# Patient Record
Sex: FEMALE | Employment: OTHER | URBAN - METROPOLITAN AREA
[De-identification: names, ages, dates, MRNs, and addresses within clinical notes are randomized per-mention and may not be internally consistent; named-entity substitution may affect disease eponyms.]

---

## 2018-08-03 ENCOUNTER — HOSPITAL ENCOUNTER (OUTPATIENT)
Dept: ULTRASOUND IMAGING | Facility: CLINIC | Age: 28
Discharge: HOME | End: 2018-08-03
Attending: FAMILY MEDICINE | Admitting: FAMILY MEDICINE
Payer: MEDICAID

## 2018-08-03 DIAGNOSIS — N91.2 AMENORRHEA: ICD-10-CM

## 2018-08-03 PROCEDURE — 76805 OB US >/= 14 WKS SNGL FETUS: CPT

## 2018-08-03 PROCEDURE — T1013 SIGN LANG/ORAL INTERPRETER: HCPCS | Mod: U3

## 2018-08-13 DIAGNOSIS — Z36.89 ENCOUNTER FOR FETAL ANATOMIC SURVEY: Primary | ICD-10-CM

## 2018-08-13 LAB
ABO + RH BLD: NORMAL
ABO + RH BLD: NORMAL
BLD GP AB SCN SERPL QL: NORMAL
HBV SURFACE AG SERPL QL IA: NONREACTIVE
HIV 1+2 AB+HIV1 P24 AG SERPL QL IA: NONREACTIVE
TREPONEMA ANTIBODIES: NONREACTIVE

## 2018-08-22 LAB
C TRACH DNA SPEC QL PROBE+SIG AMP: NEGATIVE
N GONORRHOEA DNA SPEC QL PROBE+SIG AMP: NEGATIVE

## 2018-09-04 ENCOUNTER — PRE VISIT (OUTPATIENT)
Dept: MATERNAL FETAL MEDICINE | Facility: CLINIC | Age: 28
End: 2018-09-04

## 2018-09-06 ENCOUNTER — OFFICE VISIT (OUTPATIENT)
Dept: MATERNAL FETAL MEDICINE | Facility: CLINIC | Age: 28
End: 2018-09-06
Attending: ADVANCED PRACTICE MIDWIFE
Payer: MEDICAID

## 2018-09-06 ENCOUNTER — HOSPITAL ENCOUNTER (OUTPATIENT)
Dept: ULTRASOUND IMAGING | Facility: CLINIC | Age: 28
Discharge: HOME | End: 2018-09-06
Attending: ADVANCED PRACTICE MIDWIFE | Admitting: ADVANCED PRACTICE MIDWIFE
Payer: MEDICAID

## 2018-09-06 DIAGNOSIS — O26.90 PREGNANCY RELATED CONDITION, ANTEPARTUM: ICD-10-CM

## 2018-09-06 DIAGNOSIS — Z20.821 ZIKA VIRUS EXPOSURE AFFECTING PREGNANCY: Primary | ICD-10-CM

## 2018-09-06 DIAGNOSIS — O99.891 ZIKA VIRUS EXPOSURE AFFECTING PREGNANCY: Primary | ICD-10-CM

## 2018-09-06 PROCEDURE — 76811 OB US DETAILED SNGL FETUS: CPT

## 2018-09-06 NOTE — MR AVS SNAPSHOT
After Visit Summary   9/6/2018    Christa Martinez    MRN: [id number]           Patient Information     Date Of Birth          1990        Visit Information        Provider Department      9/6/2018 10:45 AM Modesto Regalado MD [facility] Maternal Fetal Medicine [facility]        Today's Diagnoses     Zika virus exposure affecting pregnancy    -  1       Follow-ups after your visit        Your next 10 appointments already scheduled     Oct 22, 2018 10:15 AM CDT   MFM US COMPRE SINGLE F/U with [id number]   [facility] Maternal Fetal Medicine Ultrasound - [facility])    [address]  [facility] [address] [phone]           Wear comfortable clothes and leave your valuables at home.            Oct 22, 2018 10:45 AM CDT   Radiology MD with UR KAMAR CEE   [facility] Maternal Fetal Medicine - [facility])    [address]  [facility] [address]   [phone]           Please arrive at the time given for your first appointment. This visit is used internally to schedule the physician's time during your ultrasound.              Future tests that were ordered for you today     Open Future Orders        Priority Expected Expires Ordered    MFM US Comprehensive Single F/U Routine 10/18/2018 9/6/2019 9/6/2018            Who to contact     If you have questions or need follow up information about today's clinic visit or your schedule please contact [facility] MATERNAL FETAL MEDICINE [facility] directly at [phone].  Normal or non-critical lab and imaging results will be communicated to you by MyChart, letter or phone within 4 business days after the clinic has received the results. If you do not hear from us within 7 days, please contact the clinic through MyChart or phone. If you have a critical or abnormal lab result, we will notify you by phone as soon as possible.  Submit refill requests  through [employer] or call your pharmacy and they will forward the refill request to us. Please allow 3 business days for your refill to be completed.          Additional Information About Your Visit        Care EveryWhere ID     This is your Care EveryWhere ID. This could be used by other organizations to access your [place] medical records  [id number]         Blood Pressure from Last 3 Encounters:   No data found for BP    Weight from Last 3 Encounters:   No data found for Wt               Primary Care Provider Office Phone # Fax #    [facility] [phone]       [address] [place] [address]        Equal Access to Services     [facility] : Hadii aad ku hadasho Soomaali, waaxda luqadaha, qaybta kaalmada adezahraayaanjum, aden riley . So [facility] [phone].    ATENCIÓN: Si habla español, tiene a vaughan disposición servicios gratuitos de asistencia lingüística. [facility] [phone].    We comply with applicable federal civil rights laws and [place] laws. We do not discriminate on the basis of race, color, national origin, age, disability, sex, sexual orientation, or gender identity.            Thank you!     Thank you for choosing [facility]  for your care. Our goal is always to provide you with excellent care. Hearing back from our patients is one way we can continue to improve our services. Please take a few minutes to complete the written survey that you may receive in the mail after your visit with us. Thank you!             Your Updated Medication List - Protect others around you: Learn how to safely use, store and throw away your medicines at www.disposemymeds.org.      Notice  As of 9/6/2018  5:25 PM    You have not been prescribed any medications.

## 2018-09-06 NOTE — PROGRESS NOTES
"Please see \"Imaging\" tab under \"Chart Review\" for details of today's visit.    Modesto Regalado    "

## 2018-10-22 ENCOUNTER — HOSPITAL ENCOUNTER (OUTPATIENT)
Dept: ULTRASOUND IMAGING | Facility: CLINIC | Age: 28
Discharge: HOME | End: 2018-10-22
Attending: OBSTETRICS & GYNECOLOGY | Admitting: OBSTETRICS & GYNECOLOGY
Payer: MEDICAID

## 2018-10-22 ENCOUNTER — OFFICE VISIT (OUTPATIENT)
Dept: MATERNAL FETAL MEDICINE | Facility: CLINIC | Age: 28
End: 2018-10-22
Attending: OBSTETRICS & GYNECOLOGY
Payer: MEDICAID

## 2018-10-22 DIAGNOSIS — Z20.821 ZIKA VIRUS EXPOSURE AFFECTING PREGNANCY: Primary | ICD-10-CM

## 2018-10-22 DIAGNOSIS — O99.891 ZIKA VIRUS EXPOSURE AFFECTING PREGNANCY: Primary | ICD-10-CM

## 2018-10-22 DIAGNOSIS — O99.891 ZIKA VIRUS EXPOSURE AFFECTING PREGNANCY: ICD-10-CM

## 2018-10-22 DIAGNOSIS — Z20.821 ZIKA VIRUS EXPOSURE AFFECTING PREGNANCY: ICD-10-CM

## 2018-10-22 PROBLEM — Z34.80 SUPERVISION OF OTHER NORMAL PREGNANCY, ANTEPARTUM: Status: ACTIVE | Noted: 2018-10-22

## 2018-10-22 PROCEDURE — 76816 OB US FOLLOW-UP PER FETUS: CPT

## 2018-10-22 NOTE — PROGRESS NOTES
"Please see \"Imaging\" tab under \"Chart Review\" for details of today's US at the [facility].    Miguel Bear MD  Maternal-Fetal Medicine      "

## 2018-10-22 NOTE — MR AVS SNAPSHOT
After Visit Summary   10/22/2018    Christa Martinez    MRN: [id number]           Patient Information     Date Of Birth          1990        Visit Information        Provider Department      10/22/2018 10:45 AM Miguel Bear MD [facility] Maternal Fetal Medicine [facility]        Today's Diagnoses     Zika virus exposure affecting pregnancy    -  1       Follow-ups after your visit        Your next 10 appointments already scheduled     Nov 19, 2018 11:45 AM CST   MFM US COMPRE SINGLE F/U with [id number]   [facility] Maternal Fetal Medicine Ultrasound - [facility])    [address]  [facility] [address] [phone]           Wear comfortable clothes and leave your valuables at home.            Nov 19, 2018 12:15 PM CST   Radiology MD with UR KAMAR CEE   [facility] Maternal Fetal Medicine - [facility])    [address]  [facility] [address]   [phone]           Please arrive at the time given for your first appointment. This visit is used internally to schedule the physician's time during your ultrasound.              Future tests that were ordered for you today     Open Future Orders        Priority Expected Expires Ordered    MFM US Comprehensive Single F/U Routine  10/22/2019 10/22/2018            Who to contact     If you have questions or need follow up information about today's clinic visit or your schedule please contact [facility] MATERNAL FETAL MEDICINE [facility] directly at [phone].  Normal or non-critical lab and imaging results will be communicated to you by MyChart, letter or phone within 4 business days after the clinic has received the results. If you do not hear from us within 7 days, please contact the clinic through MyChart or phone. If you have a critical or abnormal lab result, we will notify you by phone as soon as possible.  Submit refill requests  through [employer] or call your pharmacy and they will forward the refill request to us. Please allow 3 business days for your refill to be completed.          Additional Information About Your Visit        Care EveryWhere ID     This is your Care EveryWhere ID. This could be used by other organizations to access your [place] medical records  [id number]         Blood Pressure from Last 3 Encounters:   No data found for BP    Weight from Last 3 Encounters:   No data found for Wt               Primary Care Provider Office Phone # Fax #    [facility] [phone]       [address] [place] [address]        Equal Access to Services     [facility] : Hadii aad ku hadasho Soomaali, waaxda luqadaha, qaybta kaalmada adezahraayaanjum, aden riley . So [facility] [phone].    ATENCIÓN: Si habla español, tiene a vaughan disposición servicios gratuitos de asistencia lingüística. [facility] [phone].    We comply with applicable federal civil rights laws and [place] laws. We do not discriminate on the basis of race, color, national origin, age, disability, sex, sexual orientation, or gender identity.            Thank you!     Thank you for choosing [facility]  for your care. Our goal is always to provide you with excellent care. Hearing back from our patients is one way we can continue to improve our services. Please take a few minutes to complete the written survey that you may receive in the mail after your visit with us. Thank you!             Your Updated Medication List - Protect others around you: Learn how to safely use, store and throw away your medicines at www.disposemymeds.org.      Notice  As of 10/22/2018 11:07 AM    You have not been prescribed any medications.

## 2018-10-24 LAB — GLU GEST SCREEN 1HR 50G: 94

## 2018-11-19 ENCOUNTER — HOSPITAL ENCOUNTER (OUTPATIENT)
Dept: ULTRASOUND IMAGING | Facility: CLINIC | Age: 28
Discharge: HOME | End: 2018-11-19
Attending: OBSTETRICS & GYNECOLOGY | Admitting: OBSTETRICS & GYNECOLOGY
Payer: COMMERCIAL

## 2018-11-19 ENCOUNTER — OFFICE VISIT (OUTPATIENT)
Dept: MATERNAL FETAL MEDICINE | Facility: CLINIC | Age: 28
End: 2018-11-19
Attending: OBSTETRICS & GYNECOLOGY
Payer: COMMERCIAL

## 2018-11-19 DIAGNOSIS — Z20.821 ZIKA VIRUS EXPOSURE AFFECTING PREGNANCY: Primary | ICD-10-CM

## 2018-11-19 DIAGNOSIS — O99.891 ZIKA VIRUS EXPOSURE AFFECTING PREGNANCY: Primary | ICD-10-CM

## 2018-11-19 DIAGNOSIS — Z20.821 ZIKA VIRUS EXPOSURE AFFECTING PREGNANCY: ICD-10-CM

## 2018-11-19 DIAGNOSIS — Z36.4 ANTENATAL SCREENING FOR FETAL GROWTH RETARDATION USING ULTRASONICS: ICD-10-CM

## 2018-11-19 DIAGNOSIS — O99.891 ZIKA VIRUS EXPOSURE AFFECTING PREGNANCY: ICD-10-CM

## 2018-11-19 PROCEDURE — 76816 OB US FOLLOW-UP PER FETUS: CPT

## 2018-11-19 NOTE — MR AVS SNAPSHOT
After Visit Summary   2018    Christa Martinez    MRN: [id number]           Patient Information     Date Of Birth          1990        Visit Information        Provider Department      2018 12:15 PM MARICEL GALVIN MD [facility] Maternal Fetal Medicine [facility]        Today's Diagnoses     Zika virus exposure affecting pregnancy    -  1     screening for fetal growth retardation using ultrasonics           Follow-ups after your visit        Who to contact     If you have questions or need follow up information about today's clinic visit or your schedule please contact [facility] MATERNAL FETAL MEDICINE [facility] directly at [phone].  Normal or non-critical lab and imaging results will be communicated to you by MyChart, letter or phone within 4 business days after the clinic has received the results. If you do not hear from us within 7 days, please contact the clinic through MyChart or phone. If you have a critical or abnormal lab result, we will notify you by phone as soon as possible.  Submit refill requests through [employer] or call your pharmacy and they will forward the refill request to us. Please allow 3 business days for your refill to be completed.          Additional Information About Your Visit        Care EveryWhere ID     This is your Care EveryWhere ID. This could be used by other organizations to access your [place] medical records  [id number]         Blood Pressure from Last 3 Encounters:   No data found for BP    Weight from Last 3 Encounters:   No data found for Wt              Today, you had the following     No orders found for display       Primary Care Provider Office Phone # Fax #    [facility] [phone]       [address] [place] [address]        Equal Access to Services     VALENTIN JACOBSON AH: Prasanna Osei, waaxda luqadaha, qaybta kaaladen [place]. So  [facility] [phone].    ATENCIÓN: Si habla catalino, tiene a vaughan disposición servicios gratuitos de asistencia lingüística. [facility] [phone].    We comply with applicable federal civil rights laws and [place] laws. We do not discriminate on the basis of race, color, national origin, age, disability, sex, sexual orientation, or gender identity.            Thank you!     Thank you for choosing [facility]  for your care. Our goal is always to provide you with excellent care. Hearing back from our patients is one way we can continue to improve our services. Please take a few minutes to complete the written survey that you may receive in the mail after your visit with us. Thank you!             Your Updated Medication List - Protect others around you: Learn how to safely use, store and throw away your medicines at www.disposemymeds.org.      Notice  As of 11/19/2018 12:56 PM    You have not been prescribed any medications.

## 2018-11-19 NOTE — PROGRESS NOTES
Please refer to ultrasound report under 'Imaging' Studies of 'Chart Review' tabs.    Jose R Garcia M.D.

## 2018-12-28 LAB
GROUP B STREP PCR: NEGATIVE
HEMOGLOBIN: 12.6 G/DL (ref 11.7–15.7)
PLATELET # BLD AUTO: 256 10^9/L

## 2019-01-25 DIAGNOSIS — O48.0 POST-TERM PREGNANCY, 40-42 WEEKS OF GESTATION: Primary | ICD-10-CM

## 2019-01-25 DIAGNOSIS — O34.219 DESIRES VBAC (VAGINAL BIRTH AFTER CESAREAN) TRIAL: ICD-10-CM

## 2019-01-25 DIAGNOSIS — Z98.891 HISTORY OF CESAREAN DELIVERY: ICD-10-CM

## 2019-01-25 PROBLEM — O09.93 HIGH-RISK PREGNANCY IN THIRD TRIMESTER: Status: ACTIVE | Noted: 2018-10-22

## 2019-01-30 ENCOUNTER — OFFICE VISIT (OUTPATIENT)
Dept: MATERNAL FETAL MEDICINE | Facility: CLINIC | Age: 29
End: 2019-01-30
Attending: OBSTETRICS & GYNECOLOGY
Payer: COMMERCIAL

## 2019-01-30 ENCOUNTER — HOSPITAL ENCOUNTER (OUTPATIENT)
Dept: ULTRASOUND IMAGING | Facility: CLINIC | Age: 29
Discharge: HOME | End: 2019-01-30
Attending: OBSTETRICS & GYNECOLOGY | Admitting: OBSTETRICS & GYNECOLOGY
Payer: COMMERCIAL

## 2019-01-30 DIAGNOSIS — O48.0 POST-TERM PREGNANCY, 40-42 WEEKS OF GESTATION: Primary | ICD-10-CM

## 2019-01-30 DIAGNOSIS — O26.90 PREGNANCY RELATED CONDITION, ANTEPARTUM: ICD-10-CM

## 2019-01-30 PROCEDURE — 76819 FETAL BIOPHYS PROFIL W/O NST: CPT

## 2019-01-30 NOTE — PROGRESS NOTES
"Please see \"Imaging\" tab under \"Chart Review\" for details of today's US at the [facility].    Miguel Bear MD  Maternal-Fetal Medicine      "

## 2019-02-01 DIAGNOSIS — O48.0 POST-TERM PREGNANCY, 40-42 WEEKS OF GESTATION: Primary | ICD-10-CM

## 2019-02-02 ENCOUNTER — HOSPITAL ENCOUNTER (OUTPATIENT)
Facility: CLINIC | Age: 29
Discharge: HOME | End: 2019-02-02
Attending: ADVANCED PRACTICE MIDWIFE | Admitting: ADVANCED PRACTICE MIDWIFE
Payer: COMMERCIAL

## 2019-02-02 VITALS — SYSTOLIC BLOOD PRESSURE: 109 MMHG | TEMPERATURE: 98.1 F | RESPIRATION RATE: 18 BRPM | DIASTOLIC BLOOD PRESSURE: 74 MMHG

## 2019-02-02 PROCEDURE — 59025 FETAL NON-STRESS TEST: CPT

## 2019-02-02 PROCEDURE — G0463 HOSPITAL OUTPT CLINIC VISIT: HCPCS | Mod: 25

## 2019-02-02 RX ORDER — ONDANSETRON 2 MG/ML
4 INJECTION INTRAMUSCULAR; INTRAVENOUS EVERY 6 HOURS PRN
Status: DISCONTINUED | OUTPATIENT
Start: 2019-02-02 | End: 2019-02-02 | Stop reason: HOSPADM

## 2019-02-02 RX ORDER — PRENATAL VIT/IRON FUM/FOLIC AC 27MG-0.8MG
1 TABLET ORAL DAILY
COMMUNITY

## 2019-02-02 SDOH — HEALTH STABILITY: MENTAL HEALTH: HOW OFTEN DO YOU HAVE A DRINK CONTAINING ALCOHOL?: NEVER

## 2019-02-02 NOTE — DISCHARGE INSTRUCTIONS
Discharge Instruction for Undelivered Patients      You were seen for: Fetal Assessment  We Consulted: Sony Aceves CNM  You had (Test or Medicine): uterine and fetal monitoring     Diet:   Drink 8 to 12 glasses of liquids (milk, juice, water) every day.  You may eat meals and snacks.     Activity:  Count fetal kicks everyday (see handout)  Call your doctor or nurse midwife if your baby is moving less than usual.     Call your provider if you notice:  Swelling in your face or increased swelling in your hands or legs.  Headaches that are not relieved by Tylenol (acetaminophen).  Changes in your vision (blurring: seeing spots or stars.)  Nausea (sick to your stomach) and vomiting (throwing up).   Weight gain of 5 pounds or more per week.  Heartburn that doesn't go away.  Signs of bladder infection: pain when you urinate (use the toilet), need to go more often and more urgently.  The bag of reddy (rupture of membranes) breaks, or you notice leaking in your underwear.  Bright red blood in your underwear.  Abdominal (lower belly) or stomach pain.  For first baby: Contractions (tightening) less than 5 minutes apart for one hour or more.  Second (plus) baby: Contractions (tightening) less than 10 minutes apart and getting stronger.  Increase or change in vaginal discharge (note the color and amount)      Follow-up:  As scheduled in the clinic

## 2019-02-02 NOTE — PLAN OF CARE
Data: Patient [facility] the [facility] at 1308.   Reason for maternal/fetal assessment per patient is Non Stress Test  . Patient is a . Prenatal record reviewed.      Obstetric History       T1      L1     SAB0   TAB0   Ectopic0   Multiple0   Live Births1       # Outcome Date GA Lbr Bubba/2nd Weight Sex Delivery Anes PTL Lv   2 Current            1 Term      -SEC   TERRY         Medical History: History reviewed. No pertinent past medical history.. Gestational Age 41w3d. VSS. Cervix: closed.  Fetal movement present. Patient denies backache, vaginal discharge, pelvic pressure, UTI symptoms, GI problems, bloody show, vaginal bleeding, edema, headache, visual disturbances, epigastric or URQ pain, rupture of membranes. Support persons  and daughter present.  Action: Verbal consent for EFM. Triage assessment completed. EFM applied for fetal assessment for post dates. Uterine assessment q3-8 min. Fetal assessment: Presumed adequate fetal oxygenation documented (see flow record). Patient education pamphlets given on fetal movement counts and discharge instructions. Patient instructed to report change in fetal movement, vaginal leaking of fluid or bleeding, abdominal pain, or any concerns related to the pregnancy to her nurse/physician.   Response: Sony Aceves CNM informed of pt arrival. Plan per provider is discharge home undelivered. Patient verbalized understanding of education and verbalized agreement with plan. Discharged ambulatory at 1500.

## 2019-02-02 NOTE — PROGRESS NOTES
"HOSPITAL TRIAGE NOTE  ===================    CHIEF COMPLAINT  ========================  Christa Martinez is a 28 year old  patient presenting today at 41w3d for postdates NST.  No LMP recorded. Patient is pregnant.  Estimated Date of Delivery: 2019     HPI  ==================   Pt presents with spouse and daughter at side to triage as recommended for NST today. She denies leakage of fluid. Pt reports two episode of spotting pinkish blood. Reports no change in fetal movement, verifying adequate fetal movement. Reports irregular contraction throughout the night progressing to a \"little\" more intense today and now some require attention to tolerate.      Prenatal record and labs reviewed from [facility], through faxed records.    CONTRACTIONS: irreg, mild every 3-8 minutes  ABDOMINAL PAIN: cramping and mild  FETAL MOVEMENT: active    VAGINAL BLEEDING: spotting  RUPTURE OF MEMBRANES: no  PELVIC PAIN: none    PREGNANCY COMPLICATIONS: previous  & postdates    REVIEW OF SYSTEMS  =====================  C: NEGATIVE for fever, chills  I: NEGATIVE for worrisome rashes, moles or lesions  E: NEGATIVE for vision changes or irritation  R: NEGATIVE for significant cough or SOB  CV: NEGATIVE for chest pain, palpitations or varicosities  GI: NEGATIVE for nausea, heartburn, or change in bowel habits POSITIVE for Abdominal cramping  : NEGATIVE for frequency, dysuria, or hematuria  M: NEGATIVE for significant arthralgias or myalgia  N: NEGATIVE for headache, weakness, dizziness or paresthesias  P: NEGATIVE for changes in mood or affect    PROBLEM LIST  ===============  Patient Active Problem List    Diagnosis Date Noted     History of  delivery 2019     Priority: Medium     Desires  (vaginal birth after ) trial 2019     Priority: Medium     High-risk pregnancy in third trimester - Pt is seen at [facility].  [facility] CNM in labor 10/22/2018     Priority: Medium     10/22/18- MFM  for " growth- possible Zika exposure- MFM US wnl, 52% growth. Follow-up in 4 weeks_____       HISTORIES  ==============  ALLERGIES:    Not on File  PAST MEDICAL HISTORY  History reviewed. No pertinent past medical history.  SOCIAL HISTORY  Social History     Socioeconomic History     Marital status:      Spouse name: Not on file     Number of children: Not on file     Years of education: Not on file     Highest education level: Not on file   Social Needs     Financial resource strain: Not on file     Food insecurity - worry: Not on file     Food insecurity - inability: Not on file     Transportation needs - medical: Not on file     Transportation needs - non-medical: Not on file   Occupational History     Not on file   Tobacco Use     Smoking status: Never Smoker     Smokeless tobacco: Never Used   Substance and Sexual Activity     Alcohol use: No     Frequency: Never     Drug use: No     Sexual activity: Yes     Partners: Male   Other Topics Concern     Not on file   Social History Narrative     Not on file     PARTNER: At Bedside     FAMILY HISTORY  History reviewed. No pertinent family history.  OB HISTORY  Obstetric History       T1      L1     SAB0   TAB0   Ectopic0   Multiple0   Live Births1       # Outcome Date GA Lbr Bubba/2nd Weight Sex Delivery Anes PTL Lv   2 Current            1 Term      -SEC   TERRY        Prenatal Labs: See Faxed records under Media tab in chart review    EXAM  ============  BP:109/74  P: 115  GENERAL APPEARANCE: healthy, alert and no distress  RESP: lungs clear to auscultation - no rales, rhonchi or wheezes  CV: regular rates and rhythm, normal S1 S2, no S3 or S4 and no murmur,and no varicosities  ABDOMEN:  soft, nontender, no epigastric pain  SKIN: no suspicious lesions or rashes  NEURO: Denies headache, blurred vision, other vision changes  PSYCH: mentation appears normal. and affect normal/bright  MS/ LEGS: 2+ edema    CONTRACTIONS: irreg, mild and every 3-8  "minutes   FETAL HEART TONES: continuous EFM- baseline 135 with moderate variability and positive accelerations. No decelerations. Initial period showed possible decelerations while patient was flat on back. They resolved immediately following reposition with hip roll with moderate variability and positive accelerations.     NST: REACTIVE    PELVIC EXAM: closed/ 50%/ Posterior/ soft/ -3   PRESENTATION: VERTEX  BLOOD: no  DISCHARGE: none    ROM: no  ROMPLUS: not done  LABS: none    DIAGNOSIS  ============  29yo  @ 41w3d seen on the [facility] Triage for postdates NST   NST: REACTIVE  Fetal Heart rate tracing: category one  Patient Active Problem List   Diagnosis     High-risk pregnancy in third trimester - Pt is seen at [facility].  WHJOSE GUADALUPE NYEM in labor     History of  delivery     Desires  (vaginal birth after ) trial     PLAN  ============  Discussed FHR tracing and possible decelerations, uncertainty if they are from positioning or placental insufficiency. Discussed options for admission and augmentation of labor or discharge home with expectant management. Patient verbalized understanding of options, desires to go home and continue to see if labor will progress on its own.  Aware that she may return or call at any time if desires IOL.  Reinforced close monitoring of fetal movement.  Discharge to home with labor instuctions per discharge instruction form.   Call or return to the [facility] with contractions, cramping, abdominal or pelvic pain, vaginal bleeding, leaking fluid or decreased fetal movement.  Follow up as scheduled on 19 for BPP    I, Femi Ann RN, NATTY am serving as a scribe to document services personally performed by ISAIAH Aguirre CNM based on the provider's statements to me.\" -Femi Ann RN, SNM    The encounter was performed by me and scribed by the SNM.  The scribed note accurately reflects my personal services and decision made by me.  -Sony CASTRO" ISAIAH Aceves CNM    Fetal Non-Stress Test Results    NST Ordered By: ISAIAH Contreras CNM    NST Start & Stop Times    NST Results  Fetus A   Baseline Rate: 135  Accelerations: Present  Decelerations: None  Interpretation: reactive

## 2019-02-04 ENCOUNTER — HOSPITAL ENCOUNTER (OUTPATIENT)
Dept: ULTRASOUND IMAGING | Facility: CLINIC | Age: 29
Discharge: HOME | End: 2019-02-04
Attending: OBSTETRICS & GYNECOLOGY | Admitting: OBSTETRICS & GYNECOLOGY
Payer: COMMERCIAL

## 2019-02-04 DIAGNOSIS — O48.0 POST-TERM PREGNANCY, 40-42 WEEKS OF GESTATION: ICD-10-CM

## 2019-02-04 LAB — RADIOLOGIST FLAGS: ABNORMAL

## 2019-02-04 PROCEDURE — 76819 FETAL BIOPHYS PROFIL W/O NST: CPT

## 2019-02-05 ENCOUNTER — HOSPITAL ENCOUNTER (INPATIENT)
Facility: CLINIC | Age: 29
LOS: 3 days | Discharge: HOME | End: 2019-02-08
Attending: ADVANCED PRACTICE MIDWIFE | Admitting: OBSTETRICS & GYNECOLOGY
Payer: COMMERCIAL

## 2019-02-05 DIAGNOSIS — Z98.891 S/P C-SECTION: Primary | ICD-10-CM

## 2019-02-05 DIAGNOSIS — D62 ANEMIA DUE TO BLOOD LOSS, ACUTE: ICD-10-CM

## 2019-02-05 LAB
BASOPHILS # BLD AUTO: 0 10E9/L (ref 0–0.2)
BASOPHILS NFR BLD AUTO: 0.1 %
DIFFERENTIAL METHOD BLD: ABNORMAL
EOSINOPHIL # BLD AUTO: 0.1 10E9/L (ref 0–0.7)
EOSINOPHIL NFR BLD AUTO: 0.5 %
ERYTHROCYTE [DISTWIDTH] IN BLOOD BY AUTOMATED COUNT: 15.1 % (ref 10–15)
HCT VFR BLD AUTO: 38.1 % (ref 35–47)
HGB BLD-MCNC: 12.2 G/DL (ref 11.7–15.7)
IMM GRANULOCYTES # BLD: 0 10E9/L (ref 0–0.4)
IMM GRANULOCYTES NFR BLD: 0.2 %
LYMPHOCYTES # BLD AUTO: 2.5 10E9/L (ref 0.8–5.3)
LYMPHOCYTES NFR BLD AUTO: 26.7 %
MCH RBC QN AUTO: 28.1 PG (ref 26.5–33)
MCHC RBC AUTO-ENTMCNC: 32 G/DL (ref 31.5–36.5)
MCV RBC AUTO: 88 FL (ref 78–100)
MONOCYTES # BLD AUTO: 0.4 10E9/L (ref 0–1.3)
MONOCYTES NFR BLD AUTO: 4.4 %
NEUTROPHILS # BLD AUTO: 6.3 10E9/L (ref 1.6–8.3)
NEUTROPHILS NFR BLD AUTO: 68.1 %
NRBC # BLD AUTO: 0 10*3/UL
NRBC BLD AUTO-RTO: 0 /100
PLATELET # BLD AUTO: 206 10E9/L (ref 150–450)
RBC # BLD AUTO: 4.34 10E12/L (ref 3.8–5.2)
WBC # BLD AUTO: 9.2 10E9/L (ref 4–11)

## 2019-02-05 PROCEDURE — 86870 RBC ANTIBODY IDENTIFICATION: CPT | Performed by: ADVANCED PRACTICE MIDWIFE

## 2019-02-05 PROCEDURE — 86902 BLOOD TYPE ANTIGEN DONOR EA: CPT | Performed by: ADVANCED PRACTICE MIDWIFE

## 2019-02-05 PROCEDURE — 86901 BLOOD TYPING SEROLOGIC RH(D): CPT | Performed by: ADVANCED PRACTICE MIDWIFE

## 2019-02-05 PROCEDURE — 85025 COMPLETE CBC W/AUTO DIFF WBC: CPT | Performed by: ADVANCED PRACTICE MIDWIFE

## 2019-02-05 PROCEDURE — 86922 COMPATIBILITY TEST ANTIGLOB: CPT | Performed by: ADVANCED PRACTICE MIDWIFE

## 2019-02-05 PROCEDURE — 86900 BLOOD TYPING SEROLOGIC ABO: CPT | Performed by: ADVANCED PRACTICE MIDWIFE

## 2019-02-05 PROCEDURE — 86905 BLOOD TYPING RBC ANTIGENS: CPT | Performed by: ADVANCED PRACTICE MIDWIFE

## 2019-02-05 PROCEDURE — 36415 COLL VENOUS BLD VENIPUNCTURE: CPT | Performed by: ADVANCED PRACTICE MIDWIFE

## 2019-02-05 PROCEDURE — 86850 RBC ANTIBODY SCREEN: CPT | Performed by: ADVANCED PRACTICE MIDWIFE

## 2019-02-05 PROCEDURE — 25000128 H RX IP 250 OP 636: Performed by: ADVANCED PRACTICE MIDWIFE

## 2019-02-05 PROCEDURE — 12000001 ZZH R&B MED SURG/OB UMMC

## 2019-02-05 PROCEDURE — 86780 TREPONEMA PALLIDUM: CPT | Performed by: ADVANCED PRACTICE MIDWIFE

## 2019-02-05 RX ORDER — OXYTOCIN/0.9 % SODIUM CHLORIDE 30/500 ML
1-24 PLASTIC BAG, INJECTION (ML) INTRAVENOUS CONTINUOUS
Status: DISCONTINUED | OUTPATIENT
Start: 2019-02-05 | End: 2019-02-06

## 2019-02-05 RX ORDER — LIDOCAINE 40 MG/G
CREAM TOPICAL
Status: DISCONTINUED | OUTPATIENT
Start: 2019-02-05 | End: 2019-02-06

## 2019-02-05 RX ORDER — ONDANSETRON 2 MG/ML
4 INJECTION INTRAMUSCULAR; INTRAVENOUS EVERY 6 HOURS PRN
Status: DISCONTINUED | OUTPATIENT
Start: 2019-02-05 | End: 2019-02-07

## 2019-02-05 RX ORDER — METHYLERGONOVINE MALEATE 0.2 MG/ML
200 INJECTION INTRAVENOUS
Status: DISCONTINUED | OUTPATIENT
Start: 2019-02-05 | End: 2019-02-07

## 2019-02-05 RX ORDER — OXYCODONE AND ACETAMINOPHEN 5; 325 MG/1; MG/1
1 TABLET ORAL
Status: DISCONTINUED | OUTPATIENT
Start: 2019-02-05 | End: 2019-02-07

## 2019-02-05 RX ORDER — OXYTOCIN/0.9 % SODIUM CHLORIDE 30/500 ML
100-340 PLASTIC BAG, INJECTION (ML) INTRAVENOUS CONTINUOUS PRN
Status: DISCONTINUED | OUTPATIENT
Start: 2019-02-05 | End: 2019-02-06

## 2019-02-05 RX ORDER — OXYTOCIN/0.9 % SODIUM CHLORIDE 30/500 ML
1-24 PLASTIC BAG, INJECTION (ML) INTRAVENOUS CONTINUOUS
Status: DISCONTINUED | OUTPATIENT
Start: 2019-02-05 | End: 2019-02-05

## 2019-02-05 RX ORDER — SODIUM CHLORIDE, SODIUM LACTATE, POTASSIUM CHLORIDE, CALCIUM CHLORIDE 600; 310; 30; 20 MG/100ML; MG/100ML; MG/100ML; MG/100ML
INJECTION, SOLUTION INTRAVENOUS CONTINUOUS
Status: DISCONTINUED | OUTPATIENT
Start: 2019-02-05 | End: 2019-02-06

## 2019-02-05 RX ORDER — SODIUM CHLORIDE, SODIUM LACTATE, POTASSIUM CHLORIDE, CALCIUM CHLORIDE 600; 310; 30; 20 MG/100ML; MG/100ML; MG/100ML; MG/100ML
INJECTION, SOLUTION INTRAVENOUS CONTINUOUS
Status: DISCONTINUED | OUTPATIENT
Start: 2019-02-05 | End: 2019-02-05

## 2019-02-05 RX ORDER — ACETAMINOPHEN 325 MG/1
650 TABLET ORAL EVERY 4 HOURS PRN
Status: DISCONTINUED | OUTPATIENT
Start: 2019-02-05 | End: 2019-02-07

## 2019-02-05 RX ORDER — MORPHINE SULFATE 10 MG/ML
10 INJECTION, SOLUTION INTRAMUSCULAR; INTRAVENOUS ONCE
Status: DISCONTINUED | OUTPATIENT
Start: 2019-02-05 | End: 2019-02-07

## 2019-02-05 RX ORDER — LIDOCAINE 40 MG/G
CREAM TOPICAL
Status: DISCONTINUED | OUTPATIENT
Start: 2019-02-05 | End: 2019-02-07

## 2019-02-05 RX ORDER — NALOXONE HYDROCHLORIDE 0.4 MG/ML
.1-.4 INJECTION, SOLUTION INTRAMUSCULAR; INTRAVENOUS; SUBCUTANEOUS
Status: DISCONTINUED | OUTPATIENT
Start: 2019-02-05 | End: 2019-02-07

## 2019-02-05 RX ORDER — HYDROXYZINE HYDROCHLORIDE 25 MG/1
100 TABLET, FILM COATED ORAL ONCE
Status: DISCONTINUED | OUTPATIENT
Start: 2019-02-05 | End: 2019-02-07

## 2019-02-05 RX ORDER — OXYTOCIN 10 [USP'U]/ML
10 INJECTION, SOLUTION INTRAMUSCULAR; INTRAVENOUS
Status: DISCONTINUED | OUTPATIENT
Start: 2019-02-05 | End: 2019-02-07

## 2019-02-05 RX ORDER — CARBOPROST TROMETHAMINE 250 UG/ML
250 INJECTION, SOLUTION INTRAMUSCULAR
Status: DISCONTINUED | OUTPATIENT
Start: 2019-02-05 | End: 2019-02-07

## 2019-02-05 RX ORDER — IBUPROFEN 800 MG/1
800 TABLET, FILM COATED ORAL
Status: DISCONTINUED | OUTPATIENT
Start: 2019-02-05 | End: 2019-02-07

## 2019-02-05 RX ORDER — LIDOCAINE 40 MG/G
CREAM TOPICAL
Status: DISCONTINUED | OUTPATIENT
Start: 2019-02-05 | End: 2019-02-05

## 2019-02-05 RX ADMIN — SODIUM CHLORIDE, POTASSIUM CHLORIDE, SODIUM LACTATE AND CALCIUM CHLORIDE 125 ML: 600; 310; 30; 20 INJECTION, SOLUTION INTRAVENOUS at 23:25

## 2019-02-05 NOTE — PLAN OF CARE
Pt. Is 41.6 wks pregnant who  is here for IOL for oligohydramnios.  Pt denies ROM, bleeding or contractions.  EFM applied for NST.  Pt oriented to call light. And Genesis Clarke CNM notified of pt arrival and status.

## 2019-02-05 NOTE — H&P
"ADMIT NOTE  =================  Chrsita Martinez is a  @ 41w6d 28 year old female with an No LMP recorded. Patient is pregnant. and Estimated Date of Delivery: 2019 is [facility] the [facility] on 2019 at 1:50 PM with for induction of labor.  Indication: late term, borderline oligohydramnios    HPI  ================  [facility] indicated by late term gestation at 41+6 and borderline oligohydramnios at today's BPP- SERGIO 5.2cm.  She reports via Portugues  that she is  feeling \"small\" contractions.  Patient denies vaginal bleeding or leakage of fluid. Denies HA, vision changes, ruq/epigastric pain,  Reports +fetal movement.  She reports her last pregnancy was a  section indicated by arrest of dilation/failed IOL-  not progressing past 3cm dilation.    Desiring TOLAC at this time and wishing to proceed with induction.    Contractions- mild and irregular  Fetal movement- active  ROM- no   Vaginal bleeding- none  GBS- negative  FOB- is involved, and at bedside engaged and providing support    Weight [id number] =25 lbs    First prenatal visit at 16 weeks, Total visits- 14    PROBLEM LIST  =================  Patient Active Problem List    Diagnosis Date Noted     Labor and delivery, indication for care 2019     Priority: Medium     History of  delivery 2019     Priority: Medium     Desires  (vaginal birth after ) trial 2019     Priority: Medium     High-risk pregnancy in third trimester - Pt is seen at [facility].  [facility] CN[facility] in labor 10/22/2018     Priority: Medium     10/22/18- MFM US for growth- possible Zika exposure- MFM US wnl, 52% growth. Follow-up in 4 weeks_____         HISTORIES  ============  No Known Allergies  History reviewed. No pertinent past medical history.  Past Surgical History:   Procedure Laterality Date      SECTION     .  History reviewed. No pertinent family " history.  Social History     Tobacco Use     Smoking status: Never Smoker     Smokeless tobacco: Never Used   Substance Use Topics     Alcohol use: No     Frequency: Never     Obstetric History       T1      L1     SAB0   TAB0   Ectopic0   Multiple0   Live Births1       # Outcome Date GA Lbr Bubba/2nd Weight Sex Delivery Anes PTL Lv   2 Current            1 Term      -SEC   TERRY           LABS:   ===========  Prenatal Labs:  Rhogam not indicated Maternal O Positive   Rubella Immune  GBS Negative   Other labs:  Results for orders placed or performed during the hospital encounter of 19 (from the past 24 hour(s))   US Fetal Biophys Prof w/o Non Stress Test   Result Value Ref Range    Radiologist flags Borderline oligohydramnios (Urgent)     Narrative    US OB FETAL BIOPHY PROFILE W/O NON STRESS SINGLE 2019 2:19 PM    HISTORY: Post-term pregnancy, 40-42 weeks of gestation    FINDINGS:   Fetal Age: 41 weeks 4 days  SERGIO: 5.2 cm  Fetal Heart Rate: 129 beats per minute  Fetal Orientation: Cephalic  Placental Location: Fundal    Fetal Breathing Movements : 2/2  Gross Body Movements: 2/2  Fetal Tone: 2/2  Amniotic Fluid Volume: 2/2    Total Score: 8/8       Impression    IMPRESSION:   1. Normal biophysical profile score of 8 of 8.  2. Borderline oligohydramnios with an SERGIO of 5.2 cm.    [Access Center: Borderline oligohydramnios]    This report will be copied to the [facility] to ensure a  provider acknowledges the finding. Access Center is available Monday  through Friday 8am-3:30 pm.     GARRISON HODGSON MD       ROS  =========  Pt denies significant respiratory, cardiovacular, GI, or muscular/skeletalcomplaints.    See RN data base ROS.     PHYSICAL EXAM:  ===============  Temp 98.2  F (36.8  C) (Oral)   General appearance: comfortable  GENERAL APPEARANCE: healthy, alert and no distress  RESP: lungs clear to auscultation - no rales, rhonchi or wheezes  BREAST: normal without masses,  tenderness or nipple discharge and no palpable axillary masses or adenopathy  CV: regular rates and rhythm, normal S1 S2, no S3 or S4 and no murmur,and no varicosities  ABDOMEN:  soft, nontender, no epigastric pain  SKIN: no suspicious lesions or rashes  NEURO: Denies headache, blurred vision, other vision changes  PSYCH: mentation appears normal. and affect normal/bright  Legs: Reflexes normal bilaterally     Abdomen: gravid, vertex fetus per Leopold's, non-tender between contractions.   Cephalic presentation confirmed by ultrasound  CONTRACTIONS: irregular, 5-10minutes  FETAL HEART TONES: continuous EFM- baseline 140 with moderate variability and positive accelerations. No decelerations.  PELVIC EXAM:  40%/ Mid/ med/ -3   HERRERA SCORE: 4  BLOODY SHOW: no   ROM:no  FLUID: clear and none  ROMPLUS: not done    # Pain Assessment:  Current Pain Score 2019   Patient currently in pain? denies   Christa s pain level was assessed and she currently denies pain.        ASSESSMENT:  ==============  IUP @ 41w6d admitted for induction of labor.  Indication: postdates   NST REACTIVE  Fetal Heart Rate - category one  GBS- - neg on     TOLAC- consent signed    Patient Active Problem List   Diagnosis     High-risk pregnancy in third trimester - Pt is seen at [facility].  [facility] CN[facility] in labor     History of  delivery     Desires  (vaginal birth after ) trial     Labor and delivery, indication for care       PLAN:  ===========  Admit - see IP orders.  Admission labs ordered- abo/rh type and screen, cbc with plts, anti-trep.  Ambulation, hydration, position changes, birthing ball and tub options to facilitate labor reviewed with pt .  Consulted with Dr. Cohen. Per Dr. Cohen, pt may choose to proceed with IOL or repeat c/s. Per MD, appropriate to place cook catheter if able or to start pitocin if cook not able to be placed.  Extensive discussion with pt and fob re: options. Reviewed and discussed IOL  "methods with prior  section, including Read Bulb, Cook Catheter, Pitocin versus repeat C/S. Discussed that pt has the choice at anytime to decide she would like to proceed with a repeat C/S. Additionally, reviewed that c/s may be recommended for maternal/fetal indications.  Pt strongly desires to proceed with IOL at this time. TOLAC consent was previously signed and is in chart. Reviewed TOLAC consent again and pt verbalized understanindg.    Plan to complete admission, IV placement, and labs. Then proceed with Cook Catheter placement.  MD on call Dr. Cohen  consulted and agrees with plan.  Pt and FOB agree with plan.  Continuous EFM and toco.     \"I, Femi Ann RN, SNM am serving as a scribe to document services personally performed by ISAIAH Og CNM based on the provider's statements to me.\" -Femi Ann RN, SNM    The encounter was performed by me and scribed by the SNM.  The scribed note accurately reflects my personal services and decision made by me.      ISAIAH Carbajal CNM     Fetal Non-Stress Test Results    NST Ordered By: ISAIAH Carbajal CNM        NST Start & Stop Times   start: 1330  Stop: 1410    NST Results  Fetus A   Baseline Rate: 140  Accelerations: Present  Decelerations: None  Interpretation: reactive      "

## 2019-02-06 ENCOUNTER — ANESTHESIA (OUTPATIENT)
Dept: OBGYN | Facility: CLINIC | Age: 29
End: 2019-02-06
Payer: COMMERCIAL

## 2019-02-06 ENCOUNTER — ANESTHESIA EVENT (OUTPATIENT)
Dept: OBGYN | Facility: CLINIC | Age: 29
End: 2019-02-06
Payer: COMMERCIAL

## 2019-02-06 ENCOUNTER — OFFICE VISIT (OUTPATIENT)
Dept: INTERPRETER SERVICES | Facility: CLINIC | Age: 29
End: 2019-02-06

## 2019-02-06 LAB
ABO + RH BLD: ABNORMAL
ABO + RH BLD: ABNORMAL
ALT SERPL W P-5'-P-CCNC: 9 U/L (ref 0–50)
AMPHETAMINES UR QL SCN: NEGATIVE
ANION GAP SERPL CALCULATED.3IONS-SCNC: 8 MMOL/L (ref 3–14)
AST SERPL W P-5'-P-CCNC: 12 U/L (ref 0–45)
BLD GP AB INVEST PLASRBC-IMP: ABNORMAL
BLD GP AB SCN SERPL QL: ABNORMAL
BLD PROD TYP BPU: ABNORMAL
BLOOD BANK CMNT PATIENT-IMP: ABNORMAL
BUN SERPL-MCNC: 10 MG/DL (ref 7–30)
CALCIUM SERPL-MCNC: 8.6 MG/DL (ref 8.5–10.1)
CANNABINOIDS UR QL: NEGATIVE
CHLORIDE SERPL-SCNC: 106 MMOL/L (ref 94–109)
CO2 SERPL-SCNC: 21 MMOL/L (ref 20–32)
COCAINE UR QL: NEGATIVE
CREAT SERPL-MCNC: 0.56 MG/DL (ref 0.52–1.04)
CREAT UR-MCNC: 74 MG/DL
ERYTHROCYTE [DISTWIDTH] IN BLOOD BY AUTOMATED COUNT: 15.5 % (ref 10–15)
GFR SERPL CREATININE-BSD FRML MDRD: >90 ML/MIN/{1.73_M2}
GLUCOSE SERPL-MCNC: 102 MG/DL (ref 70–99)
HCT VFR BLD AUTO: 38.7 % (ref 35–47)
HGB BLD-MCNC: 12.8 G/DL (ref 11.7–15.7)
MCH RBC QN AUTO: 28.3 PG (ref 26.5–33)
MCHC RBC AUTO-ENTMCNC: 33.1 G/DL (ref 31.5–36.5)
MCV RBC AUTO: 85 FL (ref 78–100)
NUM BPU REQUESTED: 2
OPIATES UR QL SCN: NEGATIVE
PCP UR QL SCN: NEGATIVE
PLATELET # BLD AUTO: 214 10E9/L (ref 150–450)
POTASSIUM SERPL-SCNC: 4.1 MMOL/L (ref 3.4–5.3)
PROT UR-MCNC: 0.4 G/L
PROT/CREAT 24H UR: 0.54 G/G CR (ref 0–0.2)
RBC # BLD AUTO: 4.53 10E12/L (ref 3.8–5.2)
SODIUM SERPL-SCNC: 135 MMOL/L (ref 133–144)
SPECIMEN EXP DATE BLD: ABNORMAL
T PALLIDUM AB SER QL: NONREACTIVE
URATE SERPL-MCNC: 4.4 MG/DL (ref 2.6–6)
WBC # BLD AUTO: 16.2 10E9/L (ref 4–11)

## 2019-02-06 PROCEDURE — 27110028 ZZH OR GENERAL SUPPLY NON-STERILE: Performed by: OBSTETRICS & GYNECOLOGY

## 2019-02-06 PROCEDURE — 37000009 ZZH ANESTHESIA TECHNICAL FEE, EACH ADDTL 15 MIN: Performed by: OBSTETRICS & GYNECOLOGY

## 2019-02-06 PROCEDURE — T1013 SIGN LANG/ORAL INTERPRETER: HCPCS | Mod: U3

## 2019-02-06 PROCEDURE — 80307 DRUG TEST PRSMV CHEM ANLYZR: CPT | Performed by: ADVANCED PRACTICE MIDWIFE

## 2019-02-06 PROCEDURE — 25000128 H RX IP 250 OP 636: Performed by: STUDENT IN AN ORGANIZED HEALTH CARE EDUCATION/TRAINING PROGRAM

## 2019-02-06 PROCEDURE — 25000128 H RX IP 250 OP 636: Performed by: ADVANCED PRACTICE MIDWIFE

## 2019-02-06 PROCEDURE — 25000125 ZZHC RX 250: Performed by: STUDENT IN AN ORGANIZED HEALTH CARE EDUCATION/TRAINING PROGRAM

## 2019-02-06 PROCEDURE — 3E0R3BZ INTRODUCTION OF ANESTHETIC AGENT INTO SPINAL CANAL, PERCUTANEOUS APPROACH: ICD-10-PCS | Performed by: ANESTHESIOLOGY

## 2019-02-06 PROCEDURE — 36000057 ZZH SURGERY LEVEL 3 1ST 30 MIN - UMMC: Performed by: OBSTETRICS & GYNECOLOGY

## 2019-02-06 PROCEDURE — 25000125 ZZHC RX 250: Performed by: ADVANCED PRACTICE MIDWIFE

## 2019-02-06 PROCEDURE — 84156 ASSAY OF PROTEIN URINE: CPT | Performed by: MIDWIFE

## 2019-02-06 PROCEDURE — 36000059 ZZH SURGERY LEVEL 3 EA 15 ADDTL MIN UMMC: Performed by: OBSTETRICS & GYNECOLOGY

## 2019-02-06 PROCEDURE — 85027 COMPLETE CBC AUTOMATED: CPT | Performed by: MIDWIFE

## 2019-02-06 PROCEDURE — 12000001 ZZH R&B MED SURG/OB UMMC

## 2019-02-06 PROCEDURE — 00HU33Z INSERTION OF INFUSION DEVICE INTO SPINAL CANAL, PERCUTANEOUS APPROACH: ICD-10-PCS | Performed by: ANESTHESIOLOGY

## 2019-02-06 PROCEDURE — 84450 TRANSFERASE (AST) (SGOT): CPT | Performed by: MIDWIFE

## 2019-02-06 PROCEDURE — 80048 BASIC METABOLIC PNL TOTAL CA: CPT | Performed by: MIDWIFE

## 2019-02-06 PROCEDURE — C9290 INJ, BUPIVACAINE LIPOSOME: HCPCS | Performed by: STUDENT IN AN ORGANIZED HEALTH CARE EDUCATION/TRAINING PROGRAM

## 2019-02-06 PROCEDURE — 25000128 H RX IP 250 OP 636

## 2019-02-06 PROCEDURE — 84550 ASSAY OF BLOOD/URIC ACID: CPT | Performed by: MIDWIFE

## 2019-02-06 PROCEDURE — 36415 COLL VENOUS BLD VENIPUNCTURE: CPT | Performed by: MIDWIFE

## 2019-02-06 PROCEDURE — 84460 ALANINE AMINO (ALT) (SGPT): CPT | Performed by: MIDWIFE

## 2019-02-06 PROCEDURE — 25000128 H RX IP 250 OP 636: Performed by: MIDWIFE

## 2019-02-06 PROCEDURE — 71000013 ZZH RECOVERY PHASE 1 LEVEL 1 EA ADDTL HR: Performed by: OBSTETRICS & GYNECOLOGY

## 2019-02-06 PROCEDURE — 27210794 ZZH OR GENERAL SUPPLY STERILE: Performed by: OBSTETRICS & GYNECOLOGY

## 2019-02-06 PROCEDURE — 71000012 ZZH RECOVERY PHASE 1 LEVEL 1 FIRST HR: Performed by: OBSTETRICS & GYNECOLOGY

## 2019-02-06 PROCEDURE — 37000008 ZZH ANESTHESIA TECHNICAL FEE, 1ST 30 MIN: Performed by: OBSTETRICS & GYNECOLOGY

## 2019-02-06 RX ORDER — FENTANYL CITRATE 50 UG/ML
25-50 INJECTION, SOLUTION INTRAMUSCULAR; INTRAVENOUS
Status: CANCELLED | OUTPATIENT
Start: 2019-02-06

## 2019-02-06 RX ORDER — LABETALOL HYDROCHLORIDE 5 MG/ML
10 INJECTION, SOLUTION INTRAVENOUS
Status: CANCELLED | OUTPATIENT
Start: 2019-02-06

## 2019-02-06 RX ORDER — ONDANSETRON 4 MG/1
4 TABLET, ORALLY DISINTEGRATING ORAL EVERY 30 MIN PRN
Status: CANCELLED | OUTPATIENT
Start: 2019-02-06

## 2019-02-06 RX ORDER — LIDOCAINE HYDROCHLORIDE 10 MG/ML
INJECTION, SOLUTION EPIDURAL; INFILTRATION; INTRACAUDAL; PERINEURAL
Status: DISCONTINUED
Start: 2019-02-06 | End: 2019-02-06 | Stop reason: HOSPADM

## 2019-02-06 RX ORDER — HYDROMORPHONE HYDROCHLORIDE 1 MG/ML
.3-.5 INJECTION, SOLUTION INTRAMUSCULAR; INTRAVENOUS; SUBCUTANEOUS EVERY 5 MIN PRN
Status: CANCELLED | OUTPATIENT
Start: 2019-02-06

## 2019-02-06 RX ORDER — MORPHINE SULFATE 1 MG/ML
INJECTION, SOLUTION EPIDURAL; INTRATHECAL; INTRAVENOUS
Status: COMPLETED
Start: 2019-02-06 | End: 2019-02-06

## 2019-02-06 RX ORDER — CHLOROPROCAINE HYDROCHLORIDE 30 MG/ML
INJECTION, SOLUTION EPIDURAL; INFILTRATION; INTRACAUDAL; PERINEURAL PRN
Status: DISCONTINUED | OUTPATIENT
Start: 2019-02-06 | End: 2019-02-06

## 2019-02-06 RX ORDER — GLYCOPYRROLATE 0.2 MG/ML
INJECTION, SOLUTION INTRAMUSCULAR; INTRAVENOUS PRN
Status: DISCONTINUED | OUTPATIENT
Start: 2019-02-06 | End: 2019-02-06

## 2019-02-06 RX ORDER — ONDANSETRON 2 MG/ML
4 INJECTION INTRAMUSCULAR; INTRAVENOUS EVERY 30 MIN PRN
Status: CANCELLED | OUTPATIENT
Start: 2019-02-06

## 2019-02-06 RX ORDER — FLUMAZENIL 0.1 MG/ML
0.2 INJECTION, SOLUTION INTRAVENOUS
Status: DISCONTINUED | OUTPATIENT
Start: 2019-02-06 | End: 2019-02-07

## 2019-02-06 RX ORDER — NALBUPHINE HYDROCHLORIDE 10 MG/ML
2.5-5 INJECTION, SOLUTION INTRAMUSCULAR; INTRAVENOUS; SUBCUTANEOUS EVERY 6 HOURS PRN
Status: DISCONTINUED | OUTPATIENT
Start: 2019-02-06 | End: 2019-02-07

## 2019-02-06 RX ORDER — OXYTOCIN 10 [USP'U]/ML
INJECTION, SOLUTION INTRAMUSCULAR; INTRAVENOUS
Status: DISCONTINUED
Start: 2019-02-06 | End: 2019-02-06 | Stop reason: HOSPADM

## 2019-02-06 RX ORDER — MORPHINE SULFATE 1 MG/ML
INJECTION, SOLUTION EPIDURAL; INTRATHECAL; INTRAVENOUS PRN
Status: DISCONTINUED | OUTPATIENT
Start: 2019-02-06 | End: 2019-02-06

## 2019-02-06 RX ORDER — EPHEDRINE SULFATE 50 MG/ML
5 INJECTION, SOLUTION INTRAMUSCULAR; INTRAVENOUS; SUBCUTANEOUS
Status: DISCONTINUED | OUTPATIENT
Start: 2019-02-06 | End: 2019-02-07

## 2019-02-06 RX ORDER — FENTANYL CITRATE 50 UG/ML
25-50 INJECTION, SOLUTION INTRAMUSCULAR; INTRAVENOUS
Status: DISCONTINUED | OUTPATIENT
Start: 2019-02-06 | End: 2019-02-07

## 2019-02-06 RX ORDER — DEXTROSE, SODIUM CHLORIDE, SODIUM LACTATE, POTASSIUM CHLORIDE, AND CALCIUM CHLORIDE 5; .6; .31; .03; .02 G/100ML; G/100ML; G/100ML; G/100ML; G/100ML
INJECTION, SOLUTION INTRAVENOUS
Status: COMPLETED
Start: 2019-02-06 | End: 2019-02-06

## 2019-02-06 RX ORDER — FENTANYL/BUPIVACAINE/NS/PF 2-1250MCG
PLASTIC BAG, INJECTION (ML) INJECTION
Status: COMPLETED
Start: 2019-02-06 | End: 2019-02-06

## 2019-02-06 RX ORDER — NALOXONE HYDROCHLORIDE 0.4 MG/ML
.1-.4 INJECTION, SOLUTION INTRAMUSCULAR; INTRAVENOUS; SUBCUTANEOUS
Status: DISCONTINUED | OUTPATIENT
Start: 2019-02-06 | End: 2019-02-07

## 2019-02-06 RX ORDER — DEXTROSE, SODIUM CHLORIDE, SODIUM LACTATE, POTASSIUM CHLORIDE, AND CALCIUM CHLORIDE 5; .6; .31; .03; .02 G/100ML; G/100ML; G/100ML; G/100ML; G/100ML
INJECTION, SOLUTION INTRAVENOUS CONTINUOUS
Status: DISCONTINUED | OUTPATIENT
Start: 2019-02-06 | End: 2019-02-09 | Stop reason: HOSPADM

## 2019-02-06 RX ORDER — LIDOCAINE HYDROCHLORIDE AND EPINEPHRINE 15; 5 MG/ML; UG/ML
INJECTION, SOLUTION EPIDURAL PRN
Status: DISCONTINUED | OUTPATIENT
Start: 2019-02-06 | End: 2019-02-06

## 2019-02-06 RX ORDER — ONDANSETRON 2 MG/ML
INJECTION INTRAMUSCULAR; INTRAVENOUS PRN
Status: DISCONTINUED | OUTPATIENT
Start: 2019-02-06 | End: 2019-02-06

## 2019-02-06 RX ORDER — CITRIC ACID/SODIUM CITRATE 334-500MG
SOLUTION, ORAL ORAL
Status: DISCONTINUED
Start: 2019-02-06 | End: 2019-02-06 | Stop reason: HOSPADM

## 2019-02-06 RX ORDER — KETOROLAC TROMETHAMINE 30 MG/ML
INJECTION, SOLUTION INTRAMUSCULAR; INTRAVENOUS PRN
Status: DISCONTINUED | OUTPATIENT
Start: 2019-02-06 | End: 2019-02-06

## 2019-02-06 RX ORDER — SODIUM CHLORIDE, SODIUM LACTATE, POTASSIUM CHLORIDE, CALCIUM CHLORIDE 600; 310; 30; 20 MG/100ML; MG/100ML; MG/100ML; MG/100ML
INJECTION, SOLUTION INTRAVENOUS CONTINUOUS
Status: CANCELLED | OUTPATIENT
Start: 2019-02-06

## 2019-02-06 RX ORDER — SODIUM CHLORIDE, SODIUM LACTATE, POTASSIUM CHLORIDE, CALCIUM CHLORIDE 600; 310; 30; 20 MG/100ML; MG/100ML; MG/100ML; MG/100ML
INJECTION, SOLUTION INTRAVENOUS CONTINUOUS PRN
Status: DISCONTINUED | OUTPATIENT
Start: 2019-02-06 | End: 2019-02-06

## 2019-02-06 RX ORDER — EPHEDRINE SULFATE 50 MG/ML
INJECTION, SOLUTION INTRAMUSCULAR; INTRAVENOUS; SUBCUTANEOUS PRN
Status: DISCONTINUED | OUTPATIENT
Start: 2019-02-06 | End: 2019-02-06

## 2019-02-06 RX ORDER — CEFAZOLIN SODIUM 1 G/3ML
INJECTION, POWDER, FOR SOLUTION INTRAMUSCULAR; INTRAVENOUS PRN
Status: DISCONTINUED | OUTPATIENT
Start: 2019-02-06 | End: 2019-02-06

## 2019-02-06 RX ORDER — FENTANYL CITRATE 50 UG/ML
50-100 INJECTION, SOLUTION INTRAMUSCULAR; INTRAVENOUS
Status: DISCONTINUED | OUTPATIENT
Start: 2019-02-06 | End: 2019-02-07

## 2019-02-06 RX ORDER — BUPIVACAINE HYDROCHLORIDE AND EPINEPHRINE 2.5; 5 MG/ML; UG/ML
INJECTION, SOLUTION INFILTRATION; PERINEURAL PRN
Status: DISCONTINUED | OUTPATIENT
Start: 2019-02-06 | End: 2019-02-06

## 2019-02-06 RX ORDER — OXYTOCIN/0.9 % SODIUM CHLORIDE 30/500 ML
PLASTIC BAG, INJECTION (ML) INTRAVENOUS
Status: DISCONTINUED
Start: 2019-02-06 | End: 2019-02-06 | Stop reason: HOSPADM

## 2019-02-06 RX ORDER — FENTANYL CITRATE 50 UG/ML
INJECTION, SOLUTION INTRAMUSCULAR; INTRAVENOUS PRN
Status: DISCONTINUED | OUTPATIENT
Start: 2019-02-06 | End: 2019-02-06

## 2019-02-06 RX ORDER — OXYTOCIN/0.9 % SODIUM CHLORIDE 30/500 ML
100 PLASTIC BAG, INJECTION (ML) INTRAVENOUS CONTINUOUS
Status: DISCONTINUED | OUTPATIENT
Start: 2019-02-06 | End: 2019-02-09 | Stop reason: HOSPADM

## 2019-02-06 RX ORDER — MISOPROSTOL 200 UG/1
TABLET ORAL
Status: DISCONTINUED
Start: 2019-02-06 | End: 2019-02-06 | Stop reason: HOSPADM

## 2019-02-06 RX ADMIN — Medication 20 MG: at 19:13

## 2019-02-06 RX ADMIN — PHENYLEPHRINE HYDROCHLORIDE 100 MCG: 10 INJECTION, SOLUTION INTRAMUSCULAR; INTRAVENOUS; SUBCUTANEOUS at 19:57

## 2019-02-06 RX ADMIN — FENTANYL CITRATE 100 MCG: 50 INJECTION, SOLUTION INTRAMUSCULAR; INTRAVENOUS at 10:57

## 2019-02-06 RX ADMIN — PHENYLEPHRINE HYDROCHLORIDE 100 MCG: 10 INJECTION, SOLUTION INTRAMUSCULAR; INTRAVENOUS; SUBCUTANEOUS at 20:03

## 2019-02-06 RX ADMIN — Medication 10 MG: at 19:15

## 2019-02-06 RX ADMIN — BUPIVACAINE HYDROCHLORIDE AND EPINEPHRINE BITARTRATE 20 ML: 2.5; .005 INJECTION, SOLUTION INFILTRATION; PERINEURAL at 20:57

## 2019-02-06 RX ADMIN — Medication: at 17:32

## 2019-02-06 RX ADMIN — OXYTOCIN-SODIUM CHLORIDE 0.9% IV SOLN 30 UNIT/500ML 1 MILLI-UNITS/MIN: 30-0.9/5 SOLUTION at 00:56

## 2019-02-06 RX ADMIN — KETOROLAC TROMETHAMINE 30 MG: 30 INJECTION, SOLUTION INTRAMUSCULAR at 20:10

## 2019-02-06 RX ADMIN — PHENYLEPHRINE HYDROCHLORIDE 0.5 MCG/KG/MIN: 10 INJECTION, SOLUTION INTRAMUSCULAR; INTRAVENOUS; SUBCUTANEOUS at 19:07

## 2019-02-06 RX ADMIN — FENTANYL CITRATE 18 MCG: 50 INJECTION, SOLUTION INTRAMUSCULAR; INTRAVENOUS at 17:40

## 2019-02-06 RX ADMIN — CHLOROPROCAINE HYDROCHLORIDE 10 ML: 30 INJECTION, SOLUTION EPIDURAL; INFILTRATION; INTRACAUDAL; PERINEURAL at 19:01

## 2019-02-06 RX ADMIN — CHLOROPROCAINE HYDROCHLORIDE 3 ML: 30 INJECTION, SOLUTION EPIDURAL; INFILTRATION; INTRACAUDAL; PERINEURAL at 20:13

## 2019-02-06 RX ADMIN — Medication 10 MG: at 19:16

## 2019-02-06 RX ADMIN — SODIUM CHLORIDE, POTASSIUM CHLORIDE, SODIUM LACTATE AND CALCIUM CHLORIDE: 600; 310; 30; 20 INJECTION, SOLUTION INTRAVENOUS at 19:47

## 2019-02-06 RX ADMIN — OXYTOCIN-SODIUM CHLORIDE 0.9% IV SOLN 30 UNIT/500ML 100 ML/HR: 30-0.9/5 SOLUTION at 23:40

## 2019-02-06 RX ADMIN — LIDOCAINE HYDROCHLORIDE,EPINEPHRINE BITARTRATE 3 ML: 15; .005 INJECTION, SOLUTION EPIDURAL; INFILTRATION; INTRACAUDAL; PERINEURAL at 17:35

## 2019-02-06 RX ADMIN — FENTANYL CITRATE 100 MCG: 50 INJECTION, SOLUTION INTRAMUSCULAR; INTRAVENOUS at 15:39

## 2019-02-06 RX ADMIN — GLYCOPYRROLATE 0.1 MCG: 0.2 INJECTION, SOLUTION INTRAMUSCULAR; INTRAVENOUS at 19:16

## 2019-02-06 RX ADMIN — SODIUM CHLORIDE, POTASSIUM CHLORIDE, SODIUM LACTATE AND CALCIUM CHLORIDE: 600; 310; 30; 20 INJECTION, SOLUTION INTRAVENOUS at 03:54

## 2019-02-06 RX ADMIN — CEFAZOLIN 2 G: 1 INJECTION, POWDER, FOR SOLUTION INTRAMUSCULAR; INTRAVENOUS at 19:08

## 2019-02-06 RX ADMIN — CHLOROPROCAINE HYDROCHLORIDE 2 ML: 30 INJECTION, SOLUTION EPIDURAL; INFILTRATION; INTRACAUDAL; PERINEURAL at 20:25

## 2019-02-06 RX ADMIN — PHENYLEPHRINE HYDROCHLORIDE 100 MCG: 10 INJECTION, SOLUTION INTRAMUSCULAR; INTRAVENOUS; SUBCUTANEOUS at 19:48

## 2019-02-06 RX ADMIN — FENTANYL CITRATE 100 MCG: 50 INJECTION, SOLUTION INTRAMUSCULAR; INTRAVENOUS at 13:26

## 2019-02-06 RX ADMIN — DEXTROSE, SODIUM CHLORIDE, SODIUM LACTATE, POTASSIUM CHLORIDE, AND CALCIUM CHLORIDE: 5; .6; .31; .03; .02 INJECTION, SOLUTION INTRAVENOUS at 23:35

## 2019-02-06 RX ADMIN — SODIUM CHLORIDE, POTASSIUM CHLORIDE, SODIUM LACTATE AND CALCIUM CHLORIDE 500 ML: 600; 310; 30; 20 INJECTION, SOLUTION INTRAVENOUS at 13:39

## 2019-02-06 RX ADMIN — BUPIVACAINE 20 ML: 13.3 INJECTION, SUSPENSION, LIPOSOMAL INFILTRATION at 20:57

## 2019-02-06 RX ADMIN — SODIUM CHLORIDE, POTASSIUM CHLORIDE, SODIUM LACTATE AND CALCIUM CHLORIDE: 600; 310; 30; 20 INJECTION, SOLUTION INTRAVENOUS at 11:42

## 2019-02-06 RX ADMIN — Medication 10 ML/HR: at 17:40

## 2019-02-06 RX ADMIN — Medication 10 MG: at 19:11

## 2019-02-06 RX ADMIN — FENTANYL CITRATE 100 MCG: 50 INJECTION, SOLUTION INTRAMUSCULAR; INTRAVENOUS at 07:42

## 2019-02-06 RX ADMIN — AZITHROMYCIN MONOHYDRATE 500 MG: 500 INJECTION, POWDER, LYOPHILIZED, FOR SOLUTION INTRAVENOUS at 19:42

## 2019-02-06 RX ADMIN — PHENYLEPHRINE HYDROCHLORIDE 100 MCG: 10 INJECTION, SOLUTION INTRAMUSCULAR; INTRAVENOUS; SUBCUTANEOUS at 19:11

## 2019-02-06 RX ADMIN — ONDANSETRON 4 MG: 2 INJECTION INTRAMUSCULAR; INTRAVENOUS at 19:52

## 2019-02-06 RX ADMIN — MORPHINE SULFATE 3 MG: 1 INJECTION, SOLUTION EPIDURAL; INTRATHECAL; INTRAVENOUS at 19:48

## 2019-02-06 RX ADMIN — SODIUM CHLORIDE, POTASSIUM CHLORIDE, SODIUM LACTATE AND CALCIUM CHLORIDE: 600; 310; 30; 20 INJECTION, SOLUTION INTRAVENOUS at 19:00

## 2019-02-06 RX ADMIN — SODIUM CHLORIDE, POTASSIUM CHLORIDE, SODIUM LACTATE AND CALCIUM CHLORIDE 500 ML: 600; 310; 30; 20 INJECTION, SOLUTION INTRAVENOUS at 17:12

## 2019-02-06 RX ADMIN — SODIUM CHLORIDE, SODIUM LACTATE, POTASSIUM CHLORIDE, CALCIUM CHLORIDE AND DEXTROSE MONOHYDRATE: 5; 600; 310; 30; 20 INJECTION, SOLUTION INTRAVENOUS at 23:35

## 2019-02-06 RX ADMIN — CHLOROPROCAINE HYDROCHLORIDE 5 ML: 30 INJECTION, SOLUTION EPIDURAL; INFILTRATION; INTRACAUDAL; PERINEURAL at 19:07

## 2019-02-06 RX ADMIN — Medication 9 ML: at 17:40

## 2019-02-06 NOTE — PLAN OF CARE
VSS.  AFEBRILE.  ASSESSMENT WNL'S.  PITOCIN INFUSING, PER PROTOCOL.  SVE 5-6/80/-3.  MEMBRANES INTACT ET NO VAGINAL BLEEDING.  FHT'S 145, WITH MODERATE VARIABILITY, WITH LATE ET EARLY DECELS.  PT. BREATHING EFFECTIVELY WITH CONTRACTIONS, DECLINES ANALGESICS.  CONTINUE WITH PLAN OF CARE.

## 2019-02-06 NOTE — PROGRESS NOTES
[facility] Labor and Delivery Progress Note    Christa Martinez MRN# [id number]   Age: 28 year old YOB: 1990           Subjective:   Christa and Elvis are both resting in bed, Christa is breathing well through contractions. Ipad  used.            Objective:     Patient Vitals for the past 24 hrs:   BP Temp Temp src Heart Rate Resp   19 0231 122/72 98.4  F (36.9  C) Oral -- 16   19 0057 117/73 -- -- -- 20   19 2341 132/87 98.4  F (36.9  C) Oral -- 20   19 2100 131/82 98  F (36.7  C) Oral 90 18   19 1600 126/81 97.8  F (36.6  C) Oral -- 18   19 1302 -- 98.2  F (36.8  C) Oral -- --   19 1257 122/80 -- -- -- 16        Cervical Exam: 6 / 80% / -3      Position: Posterior    Membranes: Intact BBOW    Fetal Heart Rate:    Monitor: external US    Variability: moderate (amplitude range 6 to 25 bpm)    Baseline Rate: normal range acceleration present, subtle early and late decelerations   Fetal Heart Rate Tracing: cat II  Contractions: 2.5-3.25 minutes, 100 seconds, moderate    Pitocin is at 3 mU/hr        Assessment:   Christa Martinez is a 28 year old  who is 42w0d here with IOL for oligohydramnios and now post dates at 42 w.          Plan:   Pain medication discussed again, she declines intervention at this time.   Anticipate   Consider AROM once head has made more descent  MD consultant on call [place]/ available prisaac Escoto CNM

## 2019-02-06 NOTE — PROGRESS NOTES
Blood pressure 124/84, temperature 97.9  F (36.6  C), temperature source Oral, resp. rate 20, SpO2 100 %.  Patient Vitals for the past 24 hrs:   BP Temp Temp src Heart Rate Resp SpO2   19 0800 -- -- -- -- -- 100 %   19 0701 124/84 -- -- -- 20 --   19 0628 (!) 135/91 -- -- -- 20 --   19 0626 (!) 140/93 97.9  F (36.6  C) Oral -- 16 --   19 0458 126/78 -- -- -- 16 --   19 0231 122/72 98.4  F (36.9  C) Oral -- 16 --   19 0057 117/73 -- -- -- 20 --   19 2341 132/87 98.4  F (36.9  C) Oral -- 20 --   19 2100 131/82 98  F (36.7  C) Oral 90 18 --   19 1600 126/81 97.8  F (36.6  C) Oral -- 18 --   19 1302 -- 98.2  F (36.8  C) Oral -- -- --   19 1257 122/80 -- -- -- 16 --     General appearance: uncomfortable with contractions. Lying in bed, breathing through contractions, relaxed between.     CONTACTIONS: every 2-5 minutes  Pitocin- 5 mu/min.,  Antibiotics- none  FETAL HEART TONES: continuous EFM- baseline 150 with moderate variability and no accelerations at this time and intermittent decelerations.  ROM: not ruptured  PELVIC EXAM:6.5/ 70%/ Anterior/ soft/ -2. Head not well applied to posterior cervix.   # Pain Assessment:  Current Pain Score 2019   Patient currently in pain? yes   - Christa is experiencing pain due to uterine contractions during labor. Pain management was discussed with Christa and her spouse and the plan was created in a collaborative fashion.  Christa's response to the current recommendations: engaged  - Opioid regimen: Christa would like a dose of IV Fentanyl   - Response to opioid medications: Reduction of symptoms.   - Bowel regimen: not needed      ASSESSMENT:  ==============  IUP @ 42w0d in active labor   Fetal Heart Rate Tracing category one  GBS- negative  Patient Active Problem List   Diagnosis     High-risk pregnancy in third trimester - Pt is seen at [facility].  [facility] CNM in labor     History of  delivery      Desires  (vaginal birth after ) trial     Labor and delivery, indication for care     Labor and delivery indication for care or intervention     PLAN:  ===========  Discussed the following plan with patient via Ipad . Pt and partner questions answered and both are in agreement with plan.  Ambulation, hydration, position changes, birthing ball/sling and tub options to facilitate labor.  Discussed PreE labs drawn due to two elevated BPS.   Continue induction of labor with Pitocin titrated per protocol.   Pain medication options reviewed with pt. Pt is interested will receive IV fentanyl at her request.   Observation and reevaluate in 1-2 hours prn  Anticipate ELIAS CEE on call, Dr. Cohen, consulted and agrees with plan.     I, NATTY Russ am serving as a scribe; to document services personally performed by  Jasmin Gonzalez CNM based on data collection and the provider's statements to me.     NATTY Russ  I  The encounter was performed by me and scribed by the SNM. The scribed note accurately reflects my personal services and decisions made by me.   Emilia Gonzalez CNM APRN

## 2019-02-06 NOTE — PROGRESS NOTES
[facility] Labor and Delivery Progress Note    Christa Martinez MRN# [id number]   Age: 28 year old YOB: 1990           Subjective:   Christa is sitting on the birth ball at the side of the bed. Is working with contractions,  Elvis is in room supporting her and keeping her in good spirits.           Objective:     Patient Vitals for the past 24 hrs:   BP Temp Temp src Heart Rate Resp   19 2341 132/87 98.4  F (36.9  C) Oral -- 20   19 2100 131/82 98  F (36.7  C) Oral 90 18   19 1600 126/81 97.8  F (36.6  C) Oral -- 18   19 1302 -- 98.2  F (36.8  C) Oral -- --   19 1257 122/80 -- -- -- 16        Cervical Exam: 4 / 40% / -2      Position: Posterior    Membranes: Intact     Fetal Heart Rate:    Monitor: external US    Variability: moderate (amplitude range 6 to 25 bpm)    Baseline Rate: normal range 140, accelerations with early decelerations.    Fetal Heart Rate Tracing: Cat I  Contractions: 6-7 mins, 100 seconds, moderate to palpation.    Pit started at 0100.        Assessment:   Christa Martinez is a 28 year old  who is 42w0d here with IOL for oligohydramnios and postdates.          Plan:   -After explaining hospital policy on urine drug screens for late initiation of prenatal care, Christa consents to UDS.   - Pitocin is running at 1mU/hr  MD consultant on call Noel/ available prn  Labor induction with Pitocin    Tami Escoto CNM

## 2019-02-06 NOTE — PROGRESS NOTES
Eval EFM strip, SVE by SNM, reassured by effacement and lower station. Ok to give another dose of Fentanyl, pt declines epidural. Will reposition to LL with peanut ball. Reinforced falls precautions. Continue plan of care. Recheck cervix in 2-3 hours or sooner if needed. Pt agrees with plan Burmese interpretor here until 1400.

## 2019-02-06 NOTE — PROGRESS NOTES
[facility] Labor and Delivery Progress Note    Christa Martinez MRN# [id number]   Age: 28 year old YOB: 1990         Subjective:   Pt is resting comfortably in bed, occasionally breathing through contractions.  left briefly to transport their daughter, Lynette, is on his way back.           Objective:     Patient Vitals for the past 24 hrs:   BP Temp Temp src Resp   19 1600 126/81 97.8  F (36.6  C) Oral 18   19 1302 -- 98.2  F (36.8  C) Oral --   19 1257 122/80 -- -- 16      Cervical Exam:  / 40% / -3      Position: Mid    Membranes: Intact     Fetal Heart Rate:    Monitor: external US    Variability: moderate (amplitude range 6 to 25 bpm)    Baseline Rate: normal range    Fetal Heart Rate Tracing: Cat I    Contractions: every 4-5, minutes palpating moderate, lasting 60-90 seconds         Assessment:   Christa Martinez is a 28 year old  who is 41w6d here with IOL for oligo and near postdates.        Plan:   Theraputic sleep - will accept this at 9 pm.  MD consultant on call [place]/ available prn.  Labor induction with cook catheter, in at 1520 80/80cc.    Tami Escoto CNM

## 2019-02-06 NOTE — PROGRESS NOTES
Blood pressure 127/79, temperature 98.1  F (36.7  C), temperature source Oral, resp. rate 22, SpO2 100 %.  Patient Vitals for the past 24 hrs:   BP Temp Temp src Heart Rate Resp SpO2   02/06/19 1600 -- -- -- -- -- 100 %   02/06/19 1530 -- -- -- -- -- 99 %   02/06/19 1427 -- -- -- -- 22 --   02/06/19 1330 127/79 98.1  F (36.7  C) Oral -- 22 --   02/06/19 1221 113/65 97.9  F (36.6  C) Oral -- 22 99 %   02/06/19 1130 -- -- -- -- -- 98 %   02/06/19 1110 -- -- -- -- -- 97 %   02/06/19 1100 128/75 98.3  F (36.8  C) Oral -- 22 100 %   02/06/19 1000 125/68 99.8  F (37.7  C) Oral -- 22 99 %   02/06/19 0930 -- -- -- -- -- 99 %   02/06/19 0858 -- -- -- -- -- 97 %   02/06/19 0853 -- -- -- -- -- 97 %   02/06/19 0848 -- -- -- -- -- 98 %   02/06/19 0843 -- -- -- -- -- 96 %   02/06/19 0838 128/80 98.6  F (37  C) Oral -- -- 97 %   02/06/19 0833 -- -- -- -- -- 98 %   02/06/19 0831 -- -- -- -- -- 93 %   02/06/19 0828 -- -- -- -- -- 97 %   02/06/19 0823 -- -- -- -- -- 97 %   02/06/19 0818 -- -- -- -- -- 97 %   02/06/19 0813 -- -- -- -- -- 97 %   02/06/19 0808 -- -- -- -- -- 98 %   02/06/19 0803 -- -- -- -- -- 96 %   02/06/19 0800 -- -- -- -- -- 100 %   02/06/19 0758 -- -- -- -- -- 100 %   02/06/19 0701 124/84 -- -- -- 20 --   02/06/19 0628 (!) 135/91 -- -- -- 20 --   02/06/19 0626 (!) 140/93 97.9  F (36.6  C) Oral -- 16 --   02/06/19 0458 126/78 -- -- -- 16 --   02/06/19 0231 122/72 98.4  F (36.9  C) Oral -- 16 --   02/06/19 0057 117/73 -- -- -- 20 --   02/05/19 2341 132/87 98.4  F (36.9  C) Oral -- 20 --   02/05/19 2100 131/82 98  F (36.7  C) Oral 90 18 --     General appearance: uncomfortable with contractions. Wincing, moaning through contractions.     CONTACTIONS: every 4-6 minutes  Pitocin- 7 mu/min.,  Antibiotics- none  FETAL HEART TONES: continuous EFM- baseline 120 with moderate variability and no accelerations. Variable and early decelerations.  ROM: clear fluid  PELVIC EXAM: 7/100/0. Some descent noted and fetal head rotation   Last cervical exam was inaccurate. Discussed 3cm cervix felt all the way around, not rim in posterior as told at last cervical exam. Still 7cm, discussed little progress over last 4 hours. Discussed risks and benefits of IUPC to better monitor contraction adequacy and Pitocin administration. Pt and partner agreeable to this plan.   # Pain Assessment:  Current Pain Score 2019   Patient currently in pain? yes   - Christa is experiencing pain due to uterine cotnractions. Pain management was discussed with Christa and her spouse and the plan was created in a collaborative fashion.  Christa's response to the current recommendations: engaged  - Pharmacologic adjuvants: Epidural . Pt and partner's questions regarding epidural risk and benefits answered. Pt requests epidural.       ASSESSMENT:  ==============  IUP @ 42w0d in transition labor and SROM x 6 hours  Protracted active   Fetal Heart Rate Tracing category two  GBS- negative  Patient Active Problem List   Diagnosis     High-risk pregnancy in third trimester - Pt is seen at [facility].  [facility] CNM in labor     History of  delivery     Desires  (vaginal birth after ) trial     Labor and delivery, indication for care     Labor and delivery indication for care or intervention     PLAN:  ===========  Reviewed slow progress this afternoon since SROM. Some fetal descent  Complete effacement and minimal dilation.  Discussed this is not normal active labor progress.  Continued IV pit titration as able per RN  Reviewed indication for IUPC placement to assess adequacy of contraction  pt wishes this to be deferred until after epidural placement and pain relief  Discussed potential for repeat  section without further dilation or continued arrest of dilation  Discussed will update  OB  Change of shift now [facility] to [facility]   Pain medication options reviewed with pt. Pt is interested in epidural at this time.     Anesthesia called to place epidural. Fluid  bolus started.  Plan to place IUPC when patient is comfortable.   Titrate Pitocin per protocol to maintain adequate MVUs.     I, Taryn Rolle, am serving as a scribe; to document services personally performed by  Emilia Hill CNM based on data collection and the provider's statements to me.     NATTY Russ    The encounter was performed by me and scribed by the SNM. The scribed note accurately reflects my personal services and decisions made by me.   Emilia Gonzalez CNM APRN

## 2019-02-06 NOTE — PROGRESS NOTES
[facility] Labor and Delivery Progress Note    Christa Martinez MRN# [id number]   Age: 28 year old YOB: 1990         Subjective:   Christa is laboring, standing at the side of the bed. She is coping well with labor contractions.            Objective:     Patient Vitals for the past 24 hrs:   BP Temp Temp src Heart Rate Resp   19 0458 126/78 -- -- -- 16   19 0231 122/72 98.4  F (36.9  C) Oral -- 16   19 0057 117/73 -- -- -- 20   19 2341 132/87 98.4  F (36.9  C) Oral -- 20   19 2100 131/82 98  F (36.7  C) Oral 90 18   19 1600 126/81 97.8  F (36.6  C) Oral -- 18   19 1302 -- 98.2  F (36.8  C) Oral -- --   19 1257 122/80 -- -- -- 16      Cervical Exam: 5-6 / 80% / -3      Position: Posterior    Membranes: Intact     Fetal Heart Rate:    Monitor: external US    Variability: moderate (amplitude range 6 to 25 bpm)    Baseline Rate: normal range 150, accelerations, early and late decels   Fetal Heart Rate Tracing: cat II    Contractions: 2-3.5, 90, moderate        Assessment:   Christa Martinez is a 28 year old  who is 42w0d here with IOL for oligohydramnios and postdates.        Plan:   Anticipate pain medication  Anticipate   MD consultant on call [place]/ available prn  Care assumed by Emilia Escoto CNM

## 2019-02-06 NOTE — ANESTHESIA PREPROCEDURE EVALUATION
Anesthesia Pre-Procedure Evaluation    Patient: Christa Martinez   MRN:     [id number] Gender:   female   Age:    28 year old :      1990        Preoperative Diagnosis: * No surgery found *        History reviewed. No pertinent past medical history.   Past Surgical History:   Procedure Laterality Date      SECTION            Anesthesia Evaluation       history and physical reviewed .      No history of anesthetic complications          ROS/MED HX    ENT/Pulmonary:  - neg pulmonary ROS     Neurologic:  - neg neurologic ROS     Cardiovascular:  - neg cardiovascular ROS       METS/Exercise Tolerance:     Hematologic:         Musculoskeletal:         GI/Hepatic:  - neg GI/hepatic ROS       Renal/Genitourinary:         Endo:         Psychiatric:         Infectious Disease:         Malignancy:         Other:                     JZG FV AN PHYSICAL EXAM    Lab Results   Component Value Date    WBC 16.2 (H) 2019    HGB 12.8 2019    HCT 38.7 2019     2019     2019    POTASSIUM 4.1 2019    CHLORIDE 106 2019    CO2 21 2019    BUN 10 2019    CR 0.56 2019     (H) 2019    DIONNE 8.6 2019    ALT 9 2019    AST 12 2019       Preop Vitals  BP Readings from Last 3 Encounters:   19 127/79   19 109/74    Pulse Readings from Last 3 Encounters:   No data found for Pulse      Resp Readings from Last 3 Encounters:   19 22   19 18    SpO2 Readings from Last 3 Encounters:   19 100%      Temp Readings from Last 1 Encounters:   19 36.7  C (98.1  F) (Oral)    Ht Readings from Last 1 Encounters:   No data found for Ht      Wt Readings from Last 1 Encounters:   No data found for Wt    There is no height or weight on file to calculate BMI.     LDA:  Peripheral IV 19 Left Hand (Active)   Site Assessment WDL 2019 11:10 AM   Line Status Infusing 2019 11:10 AM   Phlebitis Scale  0-->no symptoms 2/6/2019 11:10 AM   Infiltration Scale 0 2/6/2019 11:10 AM   Extravasation? No 2/6/2019 11:10 AM   Number of days: 1            Assessment:   ASA SCORE: 3    NPO Status: Increased aspiration risk   Documentation: Deferred   Proceeding: Proceed without further delay     Plan:   Anes. Type:  Regional     RA Details:  Labor/OB Procedure; Catheter     RA-Location/Type: Epidural (L)   Pre-Induction: None   Induction:  Not applicable   Airway: Native Airway   Access/Monitoring: PIV   Maintenance: LA-Catheter   Emergence: N/a   Logistics: Remote Procedure; Observation/Admission     Postop Pain/Sedation Strategy:  Advanced Options: LA-Catheter     PONV Management:Adult Risk Factors: Female       Comments for Plan/Consent:  Code CS called. Met patient in OR though had previously done epidural. Epidural providing good relief. Plan to use.              neg OB HOLLI Cormier Марина, DO

## 2019-02-06 NOTE — PROGRESS NOTES
Blood pressure 113/65, temperature 97.9  F (36.6  C), temperature source Oral, resp. rate 22, SpO2 99 %.  Patient Vitals for the past 24 hrs:   BP Temp Temp src Heart Rate Resp SpO2   02/06/19 1221 113/65 97.9  F (36.6  C) Oral -- 22 99 %   02/06/19 1130 -- -- -- -- -- 98 %   02/06/19 1110 -- -- -- -- -- 97 %   02/06/19 1100 128/75 98.3  F (36.8  C) Oral -- 22 100 %   02/06/19 1000 125/68 99.8  F (37.7  C) Oral -- 22 99 %   02/06/19 0930 -- -- -- -- -- 99 %   02/06/19 0858 -- -- -- -- -- 97 %   02/06/19 0853 -- -- -- -- -- 97 %   02/06/19 0848 -- -- -- -- -- 98 %   02/06/19 0843 -- -- -- -- -- 96 %   02/06/19 0838 128/80 98.6  F (37  C) Oral -- -- 97 %   02/06/19 0833 -- -- -- -- -- 98 %   02/06/19 0831 -- -- -- -- -- 93 %   02/06/19 0828 -- -- -- -- -- 97 %   02/06/19 0823 -- -- -- -- -- 97 %   02/06/19 0818 -- -- -- -- -- 97 %   02/06/19 0813 -- -- -- -- -- 97 %   02/06/19 0808 -- -- -- -- -- 98 %   02/06/19 0803 -- -- -- -- -- 96 %   02/06/19 0800 -- -- -- -- -- 100 %   02/06/19 0758 -- -- -- -- -- 100 %   02/06/19 0701 124/84 -- -- -- 20 --   02/06/19 0628 (!) 135/91 -- -- -- 20 --   02/06/19 0626 (!) 140/93 97.9  F (36.6  C) Oral -- 16 --   02/06/19 0458 126/78 -- -- -- 16 --   02/06/19 0231 122/72 98.4  F (36.9  C) Oral -- 16 --   02/06/19 0057 117/73 -- -- -- 20 --   02/05/19 2341 132/87 98.4  F (36.9  C) Oral -- 20 --   02/05/19 2100 131/82 98  F (36.7  C) Oral 90 18 --   02/05/19 1600 126/81 97.8  F (36.6  C) Oral -- 18 --   02/05/19 1302 -- 98.2  F (36.8  C) Oral -- -- --     General appearance: uncomfortable with contractions. Feeling abdominal pain, and vaginal and rectal pressure with contractions. Considering a third dose of Fentanyl. Requests SVE first. Had been lying in bed but is agreeable to standing forward leaning and rocking/swaying to facilitate rotation prior to fentanyl.    CONTACTIONS: every 2-4 minutes  Pitocin- 5 mu/min.,  Antibiotics- none  FETAL HEART TONES: continuous EFM- baseline 130  "with moderate variability and positive accelerations. No decelerations.  ROM: clear fluid  PELVIC EXAM:7/ 100%/ -1  # Pain Assessment:  Current Pain Score 2019   Patient currently in pain? yes   - Christa is experiencing pain due to uterine contractions. Requests 3rd dose of IV Fentanyl      ASSESSMENT:  ==============  IUP @ 42w0d in transition labor     Fetal Heart Rate Tracing category one  GBS- negative  Patient Active Problem List   Diagnosis     High-risk pregnancy in third trimester - Pt is seen at [facility].  [facility] CNM in labor     History of  delivery     Desires  (vaginal birth after ) trial     Labor and delivery, indication for care     Labor and delivery indication for care or intervention     PLAN:  ===========  Ambulation, hydration, position changes, birthing ball/sling and tub options to facilitate labor.  Repeat IV fentanyl as desired.   Observation and reevaluate in 1-2 hours prn  Anticipate    I, SNM, am serving as a scribe to document services personally performed by CNM based on the provider's statements to me.\"  Taryn Rolle     The encounter was performed by me and scribed by the SNM. The scribed note accurately reflects my personal services and decisions made by me.   Emilia Gonzalez CNM APRN       "

## 2019-02-06 NOTE — PROVIDER NOTIFICATION
CNM INFORMED,PITOCIN, JUST STARTED ET NEED FOR UTOX, DUE TO LATE PNC.  CNM, INFORMING PT. OF UTOX POLICY.

## 2019-02-06 NOTE — PROGRESS NOTES
Blood pressure 125/68, temperature 99.8  F (37.7  C), temperature source Oral, resp. rate 22, SpO2 99 %.  Patient Vitals for the past 24 hrs:   BP Temp Temp src Heart Rate Resp SpO2   02/06/19 1000 125/68 99.8  F (37.7  C) Oral -- 22 99 %   02/06/19 0930 -- -- -- -- -- 99 %   02/06/19 0858 -- -- -- -- -- 97 %   02/06/19 0853 -- -- -- -- -- 97 %   02/06/19 0848 -- -- -- -- -- 98 %   02/06/19 0843 -- -- -- -- -- 96 %   02/06/19 0838 128/80 98.6  F (37  C) Oral -- -- 97 %   02/06/19 0833 -- -- -- -- -- 98 %   02/06/19 0831 -- -- -- -- -- 93 %   02/06/19 0828 -- -- -- -- -- 97 %   02/06/19 0823 -- -- -- -- -- 97 %   02/06/19 0818 -- -- -- -- -- 97 %   02/06/19 0813 -- -- -- -- -- 97 %   02/06/19 0808 -- -- -- -- -- 98 %   02/06/19 0803 -- -- -- -- -- 96 %   02/06/19 0800 -- -- -- -- -- 100 %   02/06/19 0758 -- -- -- -- -- 100 %   02/06/19 0701 124/84 -- -- -- 20 --   02/06/19 0628 (!) 135/91 -- -- -- 20 --   02/06/19 0626 (!) 140/93 97.9  F (36.6  C) Oral -- 16 --   02/06/19 0458 126/78 -- -- -- 16 --   02/06/19 0231 122/72 98.4  F (36.9  C) Oral -- 16 --   02/06/19 0057 117/73 -- -- -- 20 --   02/05/19 2341 132/87 98.4  F (36.9  C) Oral -- 20 --   02/05/19 2100 131/82 98  F (36.7  C) Oral 90 18 --   02/05/19 1600 126/81 97.8  F (36.6  C) Oral -- 18 --   02/05/19 1302 -- 98.2  F (36.8  C) Oral -- -- --   02/05/19 1257 122/80 -- -- -- 16 --     General appearance: uncomfortable with contractions  Requesting another dose   CONTACTIONS: every 2-4 minutes  Pitocin- 5 mu/min.,  Antibiotics- none  FETAL HEART TONES: continuous EFM- baseline 130 with moderate variability and positive accelerations. No decelerations.  ROM: clear fluid. Pt SROM at 1030.   PELVIC EXAM:6, 80%, -1  # Pain Assessment:  Current Pain Score 2/6/2019   Patient currently in pain? yes   - Christa is experiencing pain due to uterine contractions and is requesting a second dose of IV Fenatanyl. Pain management was discussed with Christa and her spouse and  the plan was created in a collaborative fashion.  Christa's response to the current recommendations: engaged  - Opioid regimen: 2nd dose of IV Fentanyl   - Response to opioid medications: tolerable contractions, able to breathe through and relax between  - Bowel regimen: not needed      ASSESSMENT:  ==============  IUP @ 42w0d in active labor  Normotensive   Fetal Heart Rate Tracing category one  GBS- negative  Patient Active Problem List   Diagnosis     High-risk pregnancy in third trimester - Pt is seen at [facility].  [facility] CNM in labor     History of  delivery     Desires  (vaginal birth after ) trial     Labor and delivery, indication for care     Labor and delivery indication for care or intervention     PLAN:  ===========  Preeclampsia labs all WNL  n  IV dose of Fentanyl given. Discussed benefit of side lying with peanut ball to facilitate fetal rotation. Pt agrees with plan.   Ambulation, hydration, position changes, birthing ball/sling and tub options are options if patient desires and can tolerate after fentanyl.   Observation and reevaluate in 1-2 hours prn   SVE repeat in 2-4 hours and will consider IUPC at that time  Anticipate ELIAS  MD on call, Dr. Cohen, consulted and agrees with plan.     I, Taryn Rolle, am serving as a scribe; to document services personally performed by  Emilia Hill CNM based on data collection and the provider's statements to me.     NATTY Russ   The encounter was performed by me and scribed by the SNM. The scribed note accurately reflects my personal services and decisions made by me.   Emilia Gonzalez CNM APRN

## 2019-02-06 NOTE — PLAN OF CARE
VSS, See flow sheet for FHR and contraction pattern,  Ambulating in hallway with , coping well with labor. Cook catheter fell out at 2130.  Will continue to monitor and will notify provider if there is a change in status.

## 2019-02-06 NOTE — PROGRESS NOTES
Here to Place Epidural. Kinyarwanda interpretor on IPAD. Pt signed consent form and prepared for epidural

## 2019-02-06 NOTE — PROGRESS NOTES
Blood pressure 127/79, temperature 98.1  F (36.7  C), temperature source Oral, resp. rate 22, SpO2 99 %.  Patient Vitals for the past 24 hrs:   BP Temp Temp src Heart Rate Resp SpO2   02/06/19 1427 -- -- -- -- 22 --   02/06/19 1330 127/79 98.1  F (36.7  C) Oral -- 22 --   02/06/19 1221 113/65 97.9  F (36.6  C) Oral -- 22 99 %   02/06/19 1130 -- -- -- -- -- 98 %   02/06/19 1110 -- -- -- -- -- 97 %   02/06/19 1100 128/75 98.3  F (36.8  C) Oral -- 22 100 %   02/06/19 1000 125/68 99.8  F (37.7  C) Oral -- 22 99 %   02/06/19 0930 -- -- -- -- -- 99 %   02/06/19 0858 -- -- -- -- -- 97 %   02/06/19 0853 -- -- -- -- -- 97 %   02/06/19 0848 -- -- -- -- -- 98 %   02/06/19 0843 -- -- -- -- -- 96 %   02/06/19 0838 128/80 98.6  F (37  C) Oral -- -- 97 %   02/06/19 0833 -- -- -- -- -- 98 %   02/06/19 0831 -- -- -- -- -- 93 %   02/06/19 0828 -- -- -- -- -- 97 %   02/06/19 0823 -- -- -- -- -- 97 %   02/06/19 0818 -- -- -- -- -- 97 %   02/06/19 0813 -- -- -- -- -- 97 %   02/06/19 0808 -- -- -- -- -- 98 %   02/06/19 0803 -- -- -- -- -- 96 %   02/06/19 0800 -- -- -- -- -- 100 %   02/06/19 0758 -- -- -- -- -- 100 %   02/06/19 0701 124/84 -- -- -- 20 --   02/06/19 0628 (!) 135/91 -- -- -- 20 --   02/06/19 0626 (!) 140/93 97.9  F (36.6  C) Oral -- 16 --   02/06/19 0458 126/78 -- -- -- 16 --   02/06/19 0231 122/72 98.4  F (36.9  C) Oral -- 16 --   02/06/19 0057 117/73 -- -- -- 20 --   02/05/19 2341 132/87 98.4  F (36.9  C) Oral -- 20 --   02/05/19 2100 131/82 98  F (36.7  C) Oral 90 18 --   02/05/19 1600 126/81 97.8  F (36.6  C) Oral -- 18 --     General appearance: uncomfortable with contractions. Sitting up in bed, rocking with contractions. Reports feeling vaginal and rectal pressure. Interested in more pain medication. Does not want epidural.     CONTACTIONS: every 1.5-4 minutes  Pitocin- 7 mu/min.,  Antibiotics- none  FETAL HEART TONES: continuous EFM- baseline 125 with moderate variability and positive accelerations. Intermittent,  episodic variable decelerations and some early decelerations.   ROM: clear fluid  PELVIC EXAM:8.5/ 100%/ 0. Thicker at anterior, right side of patient.   ADDENDUM: Pt was rechecked at 1630 and found to be 7, 100, 0.  See NOTE FROM 1650 RECHECK     # Pain Assessment:  Current Pain Score 2019   Patient currently in pain? yes   - Christa is experiencing pain due to uterine contractions in labor. Pain management was discussed and the plan was created in a collaborative fashion.  Christa's response to the current recommendations: engaged  - Opioid regimen: 4th dose of IV Fentanyl given  - Response to opioid medications: Patient reports previous doses made contractions tolerable. Would like to repeat.    - Bowel regimen: not needed        ASSESSMENT:  ==============  IUP @ 42w0d in transition labor and SROM x 5 hours   Fetal Heart Rate Tracing category one  GBS- negative  Patient Active Problem List   Diagnosis     High-risk pregnancy in third trimester - Pt is seen at [facility].  [facility] CNM in labor     History of  delivery     Desires  (vaginal birth after ) trial          Labor and delivery indication for care or intervention     PLAN:  ===========  Ambulation or movement, side-lying with peanut ball after IV analgesia,  hydration, position changes, birthing ball/sling and tub options to facilitate labor.  Pain medication- IV fentanyl   Continues to decline epidural option   Observation and reevaluate in 1-2 hours prn  Anticipate      Taryn SCOTT, am serving as a scribe; to document services personally performed by  Emilia Gonzalez CNM based on data collection and the provider's statements to me.     NATTY Russ     The encounter was performed by me and scribed by the SNM. The scribed note accurately reflects my personal services and decisions made by me.   Emilia Gonzalez CNM APRN

## 2019-02-06 NOTE — PROGRESS NOTES
General appearance: uncomfortable with contractions. Sleeping well between contractions, but then waking and breathing through each one.   CONTACTIONS: every 2-5 minutes  Pitocin- 5 mu/min.,  Antibiotics- none  FETAL HEART TONES: continuous EFM- baseline 135 with moderate variability and positive accelerations. No decelerations.  ROM: not ruptured  PELVIC EXAM:deferred  # Pain Assessment:  Current Pain Score 2019   Patient currently in pain? yes   Pt admits to pain with contractions, but states that she is tolerating well after recent IV fentanyl.     ASSESSMENT:  ==============  IUP @ 42w0d in active labor   Fetal Heart Rate Tracing category one  GBS- negative  Patient Active Problem List   Diagnosis     High-risk pregnancy in third trimester - Pt is seen at [facility].  [facility] CNM in labor     History of  delivery     Desires  (vaginal birth after ) trial     Labor and delivery, indication for care     Labor and delivery indication for care or intervention     PLAN:  ===========  Rest and sleep between contractions as able.   Ambulation, hydration, position changes, birthing ball/sling and tub options to facilitate labor.   Observation and reevaluate in 1-2 hours prn     Taryn SCOTT, am serving as a scribe; to document services personally performed by  Emilia Gonzalez CNM based on data collection and the provider's statements to me.     NATTY Russ     The encounter was performed by me and scribed by the SNM. The scribed note accurately reflects my personal services and decisions made by me.   Emilia Gonzalez CNM APRN

## 2019-02-06 NOTE — PROGRESS NOTES
[facility] Labor and Delivery Progress Note    Christa Martinez MRN# [id number]   Age: 28 year old YOB: 1990           Subjective:   Called to room because Cook catheter came out. Christa is coping well with contractions and is eating dinner brought by Elvis.           Objective:     Patient Vitals for the past 24 hrs:   BP Temp Temp src Heart Rate Resp   19 2100 131/82 98  F (36.7  C) Oral 90 18   19 1600 126/81 97.8  F (36.6  C) Oral -- 18   19 1302 -- 98.2  F (36.8  C) Oral -- --   19 1257 122/80 -- -- -- 16      Cervical Exam: 4 / 40% / -2 to -3   Bag is flush against fetal scalp.   Position: Posterior    Membranes: Intact     Fetal Heart Rate:    Monitor: external US    Variability: moderate (amplitude range 6 to 25 bpm) accels and no decels   Baseline Rate: normal range    Fetal Heart Rate Tracing: cat I  Contractions: 3-7 minutes lasting 60-90 seconds, mild by palpation.         Assessment:   Christa Martinez is a 28 year old  who is 41w6d here with labor induction for oligohydramnios and near post dates.         Plan:   Pt requests new IV location as the one she has is painful.   Reviewed risks and benefits of labor augmentation with pitocin, that we are unable to do prostaglandins due to previous  - Christa and Elvis agree to plan.   Reviewed pain medication options.  MD consultant on call Noel/ available prn  Labor induction with Pitocin    Tami Escoto CNM

## 2019-02-06 NOTE — ANESTHESIA PROCEDURE NOTES
Epidural Procedure Note    Staff:     Anesthesiologist:  Wanda Hansen MD    Resident/CRNA:  Casa Parish DO    Procedure performed by resident/CRNA in the presence of a teaching physician    Location: OB     Procedure start time:  2/6/2019 5:20 PM     Procedure end time:  2/6/2019 5:40 PM   Pre-procedure checklist:   patient identified, IV checked, site marked, risks and benefits discussed, informed consent, monitors and equipment checked, pre-op evaluation, at physician/surgeon's request and post-op pain management      Correct Patient: Yes      Correct Position: Yes      Correct Site: Yes      Correct Procedure: Yes      Correct Laterality:  Yes    Site Marked:  Yes  Procedure:     Procedure:  Epidural catheter    ASA:  1    Diagnosis:  Labor pain    Position:  Sitting    Sterile Prep: chloraprep, mask, sterile gloves and patient draped      Insertion site:  L2-3    Local skin infiltration:  1% lidocaine    amount (mL):  3    Approach:  Midline    Needle gauge (G):  18    Needle Length (in):  3.5    Block Needle Type:  Touhy    Injection Technique:  LORT saline    NICOLE at (cm):  6    Attempts:  2    Redirects:  0    Catheter gauge (G):  19    Catheter threaded easily: Yes      Threaded to cm at skin:  11    Threaded in epidural space (cm):  5    Paresthesias:  No    Aspiration negative for Heme or CSF: Yes       Local anesthetic:  Lidocaine 1.5% w/ 1:200,000 epinephrine    Test dose time:  17:35    Test dose negative for signs of intravascular, subdural or intrathecal injection: Yes

## 2019-02-06 NOTE — PROVIDER NOTIFICATION
CNM DISCUSSING PLAN OF CARE, WITH PT., WITH ASSIST OF IPAD INTERPRETOR. PT VERBALIZES UNDERSTANDING.

## 2019-02-07 PROBLEM — Z98.891 S/P C-SECTION: Status: ACTIVE | Noted: 2019-02-07

## 2019-02-07 LAB
CREAT SERPL-MCNC: 0.61 MG/DL (ref 0.52–1.04)
GFR SERPL CREATININE-BSD FRML MDRD: >90 ML/MIN/{1.73_M2}
HGB BLD-MCNC: 9.4 G/DL (ref 11.7–15.7)
PLATELET # BLD AUTO: 171 10E9/L (ref 150–450)

## 2019-02-07 PROCEDURE — 85049 AUTOMATED PLATELET COUNT: CPT | Performed by: STUDENT IN AN ORGANIZED HEALTH CARE EDUCATION/TRAINING PROGRAM

## 2019-02-07 PROCEDURE — 82565 ASSAY OF CREATININE: CPT | Performed by: STUDENT IN AN ORGANIZED HEALTH CARE EDUCATION/TRAINING PROGRAM

## 2019-02-07 PROCEDURE — 36415 COLL VENOUS BLD VENIPUNCTURE: CPT | Performed by: STUDENT IN AN ORGANIZED HEALTH CARE EDUCATION/TRAINING PROGRAM

## 2019-02-07 PROCEDURE — 25000128 H RX IP 250 OP 636: Performed by: STUDENT IN AN ORGANIZED HEALTH CARE EDUCATION/TRAINING PROGRAM

## 2019-02-07 PROCEDURE — 25000132 ZZH RX MED GY IP 250 OP 250 PS 637: Performed by: STUDENT IN AN ORGANIZED HEALTH CARE EDUCATION/TRAINING PROGRAM

## 2019-02-07 PROCEDURE — 12000001 ZZH R&B MED SURG/OB UMMC

## 2019-02-07 PROCEDURE — 85018 HEMOGLOBIN: CPT | Performed by: STUDENT IN AN ORGANIZED HEALTH CARE EDUCATION/TRAINING PROGRAM

## 2019-02-07 RX ORDER — IBUPROFEN 800 MG/1
800 TABLET, FILM COATED ORAL EVERY 6 HOURS PRN
Qty: 60 TABLET | Refills: 0 | Status: SHIPPED | OUTPATIENT
Start: 2019-02-07 | End: 2019-03-09

## 2019-02-07 RX ORDER — IBUPROFEN 800 MG/1
800 TABLET, FILM COATED ORAL EVERY 6 HOURS PRN
Status: DISCONTINUED | OUTPATIENT
Start: 2019-02-07 | End: 2019-02-09 | Stop reason: HOSPADM

## 2019-02-07 RX ORDER — HYDROCORTISONE 2.5 %
CREAM (GRAM) TOPICAL 3 TIMES DAILY PRN
Status: DISCONTINUED | OUTPATIENT
Start: 2019-02-07 | End: 2019-02-09 | Stop reason: HOSPADM

## 2019-02-07 RX ORDER — AMOXICILLIN 250 MG
1 CAPSULE ORAL 2 TIMES DAILY PRN
Status: DISCONTINUED | OUTPATIENT
Start: 2019-02-07 | End: 2019-02-09 | Stop reason: HOSPADM

## 2019-02-07 RX ORDER — LIDOCAINE 40 MG/G
CREAM TOPICAL
Status: DISCONTINUED | OUTPATIENT
Start: 2019-02-07 | End: 2019-02-09 | Stop reason: HOSPADM

## 2019-02-07 RX ORDER — LANOLIN 100 %
OINTMENT (GRAM) TOPICAL
Status: DISCONTINUED | OUTPATIENT
Start: 2019-02-07 | End: 2019-02-09 | Stop reason: HOSPADM

## 2019-02-07 RX ORDER — OXYTOCIN/0.9 % SODIUM CHLORIDE 30/500 ML
340 PLASTIC BAG, INJECTION (ML) INTRAVENOUS CONTINUOUS PRN
Status: DISCONTINUED | OUTPATIENT
Start: 2019-02-07 | End: 2019-02-09 | Stop reason: HOSPADM

## 2019-02-07 RX ORDER — ACETAMINOPHEN 325 MG/1
650 TABLET ORAL EVERY 6 HOURS PRN
Qty: 100 TABLET | Refills: 0 | Status: SHIPPED | OUTPATIENT
Start: 2019-02-07 | End: 2019-03-09

## 2019-02-07 RX ORDER — KETOROLAC TROMETHAMINE 30 MG/ML
30 INJECTION, SOLUTION INTRAMUSCULAR; INTRAVENOUS EVERY 6 HOURS
Status: ACTIVE | OUTPATIENT
Start: 2019-02-07 | End: 2019-02-08

## 2019-02-07 RX ORDER — NALOXONE HYDROCHLORIDE 0.4 MG/ML
.1-.4 INJECTION, SOLUTION INTRAMUSCULAR; INTRAVENOUS; SUBCUTANEOUS
Status: DISCONTINUED | OUTPATIENT
Start: 2019-02-07 | End: 2019-02-07

## 2019-02-07 RX ORDER — NALOXONE HYDROCHLORIDE 0.4 MG/ML
.1-.4 INJECTION, SOLUTION INTRAMUSCULAR; INTRAVENOUS; SUBCUTANEOUS
Status: DISCONTINUED | OUTPATIENT
Start: 2019-02-07 | End: 2019-02-09 | Stop reason: HOSPADM

## 2019-02-07 RX ORDER — OXYCODONE HYDROCHLORIDE 5 MG/1
5-10 TABLET ORAL
Status: DISCONTINUED | OUTPATIENT
Start: 2019-02-07 | End: 2019-02-09 | Stop reason: HOSPADM

## 2019-02-07 RX ORDER — ACETAMINOPHEN 325 MG/1
650 TABLET ORAL EVERY 4 HOURS PRN
Status: DISCONTINUED | OUTPATIENT
Start: 2019-02-09 | End: 2019-02-09 | Stop reason: HOSPADM

## 2019-02-07 RX ORDER — SIMETHICONE 80 MG
80 TABLET,CHEWABLE ORAL 4 TIMES DAILY PRN
Status: DISCONTINUED | OUTPATIENT
Start: 2019-02-07 | End: 2019-02-09 | Stop reason: HOSPADM

## 2019-02-07 RX ORDER — OXYTOCIN 10 [USP'U]/ML
10 INJECTION, SOLUTION INTRAMUSCULAR; INTRAVENOUS
Status: DISCONTINUED | OUTPATIENT
Start: 2019-02-07 | End: 2019-02-09 | Stop reason: HOSPADM

## 2019-02-07 RX ORDER — ACETAMINOPHEN 325 MG/1
975 TABLET ORAL EVERY 8 HOURS
Status: DISCONTINUED | OUTPATIENT
Start: 2019-02-07 | End: 2019-02-09 | Stop reason: HOSPADM

## 2019-02-07 RX ORDER — BISACODYL 10 MG
10 SUPPOSITORY, RECTAL RECTAL DAILY PRN
Status: DISCONTINUED | OUTPATIENT
Start: 2019-02-08 | End: 2019-02-09 | Stop reason: HOSPADM

## 2019-02-07 RX ORDER — ONDANSETRON 2 MG/ML
4 INJECTION INTRAMUSCULAR; INTRAVENOUS EVERY 6 HOURS PRN
Status: DISCONTINUED | OUTPATIENT
Start: 2019-02-07 | End: 2019-02-09 | Stop reason: HOSPADM

## 2019-02-07 RX ORDER — AMOXICILLIN 250 MG
1 CAPSULE ORAL 2 TIMES DAILY PRN
Qty: 100 TABLET | Refills: 0 | Status: SHIPPED | OUTPATIENT
Start: 2019-02-07 | End: 2019-03-09

## 2019-02-07 RX ORDER — AMOXICILLIN 250 MG
2 CAPSULE ORAL 2 TIMES DAILY PRN
Status: DISCONTINUED | OUTPATIENT
Start: 2019-02-07 | End: 2019-02-09 | Stop reason: HOSPADM

## 2019-02-07 RX ORDER — FERROUS SULFATE 325(65) MG
325 TABLET ORAL
Qty: 60 TABLET | Refills: 0 | Status: SHIPPED | OUTPATIENT
Start: 2019-02-07 | End: 2019-03-09

## 2019-02-07 RX ADMIN — KETOROLAC TROMETHAMINE 30 MG: 30 INJECTION, SOLUTION INTRAMUSCULAR at 03:43

## 2019-02-07 RX ADMIN — SENNOSIDES AND DOCUSATE SODIUM 1 TABLET: 8.6; 5 TABLET ORAL at 20:46

## 2019-02-07 RX ADMIN — KETOROLAC TROMETHAMINE 30 MG: 30 INJECTION, SOLUTION INTRAMUSCULAR at 09:21

## 2019-02-07 RX ADMIN — ACETAMINOPHEN 975 MG: 325 TABLET, FILM COATED ORAL at 01:20

## 2019-02-07 RX ADMIN — KETOROLAC TROMETHAMINE 30 MG: 30 INJECTION, SOLUTION INTRAMUSCULAR at 15:13

## 2019-02-07 RX ADMIN — ENOXAPARIN SODIUM 40 MG: 40 INJECTION SUBCUTANEOUS at 20:46

## 2019-02-07 RX ADMIN — ACETAMINOPHEN 975 MG: 325 TABLET, FILM COATED ORAL at 18:00

## 2019-02-07 RX ADMIN — ACETAMINOPHEN 975 MG: 325 TABLET, FILM COATED ORAL at 09:21

## 2019-02-07 RX ADMIN — SENNOSIDES AND DOCUSATE SODIUM 2 TABLET: 8.6; 5 TABLET ORAL at 09:23

## 2019-02-07 NOTE — DISCHARGE SUMMARY
[facility] Discharge Summary    Christa Martinez MRN# [id number]   Age: 28 year old YOB: 1990     Date of Admission:  2019  Date of Discharge:  2019   Admitting Physician:  Genesis Wilhelm [facility]  Discharge Physician:  Zuleika Mcdermott MD    Admit Dx:   -  at 41w6d  - late term gestation   - history of  section  - Obesity  - HepB nonimmune    Discharge Dx:  - Same as above, s/p urgent repeat low transverse  section  - Fetal bradycardia  - Failed TOLAC    Procedures:  - urgent repeat low transverse  section with double layer uterine closure via Pfannenstiel incision  - Spinal analgesia  - TAP block    Admit HPI:  Christa Martinez is a 28 year old  at 42w0d admitted for an induction of labor due to late term gestation. Her pregnancy was notable for history of previous  section in [place], obesity and HepB nonimmune. she progressed to 9 cm dilated and a category III fetal heart tracing with fetal bradycardia developed. Delivery via STAT  section was recommended.  The risks, benefits, and alternatives of  section were discussed with the patient, and she agreed to proceed.  Verbal consent was obtained.  When the fetal heart tones were checked in the OR the heart rate was normal. The  section was downgraded to a urgent  section.     Please see her admit H&P for full details of her PMH, PSH, Meds, Allergies and exam on admit.    Operative Course:  Surgery was complicated by a left hysterotomy extension. EBL from the delivery was 920. Please see her  Section Operative Note for full details regarding her delivery.    Operative Findings: Vigorous male infant born on 2021 at 1932 by  section. APGARs 8/9 at 1 and 5 minutes, weighing pending. Cephalic presentation, ROT position. Clear amniotic fluid. No nuchal cord. Placenta delivered intact with  3-vessel cord. Normal appearing uterus, ovaries and fallopian tubes. Moderate abdominal wall adhesions with minimal intraabdominal wall adhesions.     Postoperative Course:  Her postoperative course was uncomplicated. On POD#2, she was meeting all of her postpartum goals and deemed stable for discharge. She was voiding without difficulty, tolerating a regular diet without nausea and vomiting, her pain was well controlled on oral pain medicines and her lochia was appropriate. Her hemoglobin prior to delivery was 12.8 and after delivery was 9.4. Her Rh status was positive and Rhogam was not indicated.    Discharge Medications:     Review of your medicines      START taking      Dose / Directions   acetaminophen 325 MG tablet  Commonly known as:  TYLENOL      Dose:  650 mg  Take 2 tablets (650 mg) by mouth every 6 hours as needed for pain  Quantity:  100 tablet  Refills:  0     ferrous sulfate 325 (65 Fe) MG tablet  Commonly known as:  FEROSUL  Used for:  Anemia due to blood loss, acute      Dose:  325 mg  Take 1 tablet (325 mg) by mouth daily (with breakfast)  Quantity:  60 tablet  Refills:  0     ibuprofen 800 MG tablet  Commonly known as:  ADVIL/MOTRIN      Dose:  800 mg  Take 1 tablet (800 mg) by mouth every 6 hours as needed for other (cramping)  Quantity:  60 tablet  Refills:  0     senna-docusate 8.6-50 MG tablet  Commonly known as:  SENOKOT-S/PERICOLACE      Dose:  1 tablet  Take 1 tablet by mouth 2 times daily as needed for constipation  Quantity:  100 tablet  Refills:  0        CONTINUE these medicines which have NOT CHANGED      Dose / Directions   prenatal multivitamin w/iron 27-0.8 MG tablet      Dose:  1 tablet  Take 1 tablet by mouth daily  Refills:  0           Where to get your medicines      These medications were sent to [facility] [place], [place] - [address]  [address] [address]    Phone:  [phone]     acetaminophen 325 MG tablet    ferrous  sulfate 325 (65 Fe) MG tablet    ibuprofen 800 MG tablet    senna-docusate 8.6-50 MG tablet       Discharge/Disposition:  Christa Martinez was discharged to home in stable condition with the following instructions/medications:  1) Call for temperature > 100.4, bright red vaginal bleeding >1 pad an hour x 2 hours, foul smelling vaginal discharge, pain not controlled by usual oral pain meds, persistent nausea and vomiting not controlled on medications, drainage or redness from incision site  2) She desired IUD for contraception.  3) For feeding she decided to breastfeed.  4) She was instructed to follow-up with her primary OB in 6 weeks for a routine postpartum visit and IUD insertion.  5) Discharge activity:  No heavy lifting >15 lbs or strenuous activity for 6 weeks, pelvic rest for 6 weeks, no driving or operating machinery while on narcotics.    Jeffery Jiang MD  OB/GYN Resident, PGY-3  2/8/2019     [facility] staff:  Appreciate note by Dr. Jiang.  I have seen and examined the patient without the resident. I have reviewed, edited, and agree with the note.        Zuleika Mcdermott MD, FACOG  2/11/2019  2:35 PM

## 2019-02-07 NOTE — PLAN OF CARE
Patient's postpartum assessments WDL, vital signs stable. Fundus firm and midline, lochia WDL. Incision dressing dry and intact. Passing a little gas. Hydration and ambulation encouraged, stool softener given. Taking tylenol and ibuprofen for pain control, will let RN know if she needs roxicodone. Patient ambulated in room to bathroom at 1200 and sofia-care done. Read catheter removed at 1215, adequate urine output in catheter prior to removal. Unable to void x 2 and bladder scan showed 350cc. RN flyer (Amisha) came to straight cath patient, but was not able to after 4 attempts. Read catheter inserted at 1742 by resident. Ambulates well in room. Breastfeeds infant well on cue, latch- on verified. Bonding well with infant. EDS completed with  and patient scored 1. Will continue to monitor.

## 2019-02-07 NOTE — BRIEF OP NOTE
[facility]   Brief Operative Note     Surgery Date: 2019  Surgeon:  Latasha Tiwari MD  Assistants:  Starla Lamar MD PhD PGY-3      Pre-op Diagnosis:    -  at 42w0d  - Fetal bradycardia  - Failed TOLAC  - history of  section  - Obesity    Post-op Diagnosis:    - Same as above    Procedure:  Repeat low-transverse  section with double layer uterine closure via Pfannensteil incision    Anesthesia: Spinal  EBL:  920 mL  IVF:  800 mL crystalloid  UOP:  200 mL clear urine at the end of the case  Drains: Read catheter   Specimens:  Placenta, cord segment  Complications: None     Findings:   Vigorous male infant born on 2019 at 1932 by  section. APGARs 8/9 at 1 and 5 minutes, weighing pending. Cephalic presentation, ROT position. Clear amniotic fluid. No nuchal cord. Placenta delivered intact with 3-vessel cord. Normal appearing uterus, ovaries and fallopian tubes. Moderate abdominal wall adhesions with minimal intraabdominal wall adhesions.       Disposition:  Stable to PACU    Starla Lamar MD PhD  Ob/Gyn PGY-3  2019 8:43 PM

## 2019-02-07 NOTE — PROGRESS NOTES
[facility]   Post-partum Note    Name:  Christa Martinez  MRN: [id number]    S: Patient is feel well this morning.  Pain is controlled.  Denies dizziness, chest pain, SOB, nausea or vomiting. Read catheter is in place.   breast feeding.     O:   Patient Vitals for the past 24 hrs:   BP Temp Temp src Pulse Heart Rate Resp SpO2   02/07/19 0519 99/55 -- -- -- -- 18 100 %   02/07/19 0429 105/55 -- -- -- -- 18 98 %   02/07/19 0334 104/64 98  F (36.7  C) Oral -- -- 18 99 %   02/07/19 0217 104/75 -- -- 83 -- 18 98 %   02/07/19 0100 104/61 98.4  F (36.9  C) Oral 87 -- 18 98 %   02/07/19 0011 104/65 99.1  F (37.3  C) Oral 72 -- 18 100 %   02/06/19 2330 103/53 98.4  F (36.9  C) Oral 75 -- 18 --   02/06/19 2300 104/47 97.3  F (36.3  C) Oral -- -- 18 99 %   02/06/19 2225 98/56 -- -- 73 71 20 100 %   02/06/19 2210 107/53 -- -- 64 71 18 100 %   02/06/19 2155 107/51 -- -- 68 72 15 100 %   02/06/19 2140 96/55 -- -- 64 65 21 98 %   02/06/19 2125 107/58 -- -- 61 68 13 99 %   02/06/19 2120 -- -- -- -- 77 20 99 %   02/06/19 2105 116/65 -- -- -- -- 18 98 %   02/06/19 2055 108/58 -- -- -- -- 18 100 %   02/06/19 2040 102/68 97.4  F (36.3  C) Oral -- -- 18 100 %   02/06/19 1846 117/70 -- -- -- -- 24 --   02/06/19 1837 119/61 -- -- -- -- 20 --   02/06/19 1833 -- -- -- -- -- -- 100 %   02/06/19 1831 109/65 -- -- -- -- 24 --   02/06/19 1828 -- -- -- -- -- -- 99 %   02/06/19 1827 118/71 -- -- -- -- 24 --   02/06/19 1823 -- -- -- -- -- -- 99 %   02/06/19 1821 115/71 98.3  F (36.8  C) Oral -- -- 24 --   02/06/19 1818 -- -- -- -- -- -- 98 %   02/06/19 1816 123/79 -- -- -- -- -- --   02/06/19 1814 123/79 -- -- -- -- -- 99 %   02/06/19 1813 -- -- -- -- -- -- 99 %   02/06/19 1812 119/75 -- -- -- -- -- --   02/06/19 1808 -- -- -- -- -- -- 99 %   02/06/19 1807 119/76 -- -- -- -- -- --   02/06/19 1803 -- -- -- -- -- -- 99 %   02/06/19 1802 121/71 -- -- -- -- 22 --   02/06/19 1758 -- -- -- -- -- -- 99 %   02/06/19  1755 120/74 -- -- -- -- 22 --   02/06/19 1754 120/72 -- -- -- -- 22 --   02/06/19 1753 -- -- -- -- -- -- 99 %   02/06/19 1752 115/66 -- -- -- -- 24 --   02/06/19 1749 122/72 -- -- -- -- 24 --   02/06/19 1748 122/72 -- -- -- -- 24 99 %   02/06/19 1746 113/64 -- -- -- -- 24 --   02/06/19 1743 113/72 -- -- -- -- 24 98 %   02/06/19 1742 117/72 -- -- -- -- 24 98 %   02/06/19 1740 116/72 -- -- -- -- 24 --   02/06/19 1738 124/66 -- -- -- -- 24 99 %   02/06/19 1736 128/77 -- -- -- -- 24 --   02/06/19 1733 -- -- -- -- -- -- 100 %   02/06/19 1728 -- -- -- -- -- -- 100 %   02/06/19 1723 -- 98.4  F (36.9  C) Oral -- -- -- --   02/06/19 1631 -- -- -- -- -- -- (!) 87 %   02/06/19 1630 -- -- -- -- -- -- 95 %   02/06/19 1625 -- -- -- -- -- -- 96 %   02/06/19 1620 -- -- -- -- -- -- 97 %   02/06/19 1600 -- -- -- -- -- -- 100 %   02/06/19 1535 -- -- -- -- -- -- 98 %   02/06/19 1530 -- -- -- -- -- -- 99 %   02/06/19 1427 121/74 98.3  F (36.8  C) Oral -- -- 22 --   02/06/19 1330 127/79 98.1  F (36.7  C) Oral -- -- 22 --   02/06/19 1221 113/65 97.9  F (36.6  C) Oral -- -- 22 99 %   02/06/19 1130 -- -- -- -- -- -- 98 %   02/06/19 1110 -- -- -- -- -- -- 97 %   02/06/19 1100 128/75 98.3  F (36.8  C) Oral -- -- 22 100 %   02/06/19 1000 125/68 99.8  F (37.7  C) Oral -- -- 22 99 %   02/06/19 0930 -- -- -- -- -- -- 99 %   02/06/19 0858 -- -- -- -- -- -- 97 %   02/06/19 0853 -- -- -- -- -- -- 97 %   02/06/19 0848 -- -- -- -- -- -- 98 %   02/06/19 0843 -- -- -- -- -- -- 96 %   02/06/19 0838 128/80 98.6  F (37  C) Oral -- -- -- 97 %   02/06/19 0833 -- -- -- -- -- -- 98 %   02/06/19 0831 -- -- -- -- -- -- 93 %   02/06/19 0828 -- -- -- -- -- -- 97 %   02/06/19 0823 -- -- -- -- -- -- 97 %   02/06/19 0818 -- -- -- -- -- -- 97 %   02/06/19 0813 -- -- -- -- -- -- 97 %   02/06/19 0808 -- -- -- -- -- -- 98 %   02/06/19 0803 -- -- -- -- -- -- 96 %   02/06/19 0800 -- -- -- -- -- -- 100 %   02/06/19 0758 -- -- -- -- -- -- 100 %   02/06/19 0701 124/84 -- --  -- -- 20 --     Gen:  Resting comfortably, NAD  CV:  RRR, no murmur  Pulm:  CTAB, no wheezes  Abd:  Soft, appropriately ttp, non-distended.Fundus is below the  umbilicus, firm and non-tender.  Incision: covered by a bandage that is clean and dry  Ext:  non-tender, 1+ LE edema b/l    I/O last 3 completed shifts:  In: 5829.36 [P.O.:1500; I.V.:3829.36; IV Piggyback:500]  Out: 4720 [Urine:3500; Blood:1220]    Hgb:   Hemoglobin   Date Value Ref Range Status   2019 12.8 11.7 - 15.7 g/dL Final       Assessment/Plan:  Christa Martinez is a 28 year old  on PPD #1 s/p urgent RLTCS for non-reassuring fetal heart tracing.     Elevated BP:  - One elevated BP on 19 in the AM  - UPC 0.54 (unknown if this was a clean catch vs straight cath specimen)  - HELLP wnl  - no signs or symptoms of pre-eclampsia    - continue with routine postpartum management  Pain: Well-controlled  Hgb: 12.8>> AM Hgb pending. Patient is asymptomatic from acute blood loss anemia and vitals are reassuring will discharge home with PO iron if hgb <10  Rh: positive  Rubella: immune  HepB: nonimmune, will offer HepB vaccine PP  Feed: breast  BC: Not discussed  Dispo: DC pending clinical course    Starla Lamar MD PhD  Ob/Gyn PGY-3  2019 6:34 AM    Appreciate note by Dr. Lamar. Patient has been seen and examined by me separate from the resident, agree with above note.   Hemoglobin   Date Value Ref Range Status   2019 9.4 (L) 11.7 - 15.7 g/dL Final   Plans IUD for contraception.    Maddison Edgar MD  4:32 PM

## 2019-02-07 NOTE — PROGRESS NOTES
Blood pressure 119/61, temperature 98.1  F (36.7  C), temperature source Oral, resp. rate 20, SpO2 100 %.  Patient Vitals for the past 24 hrs:   BP Temp Temp src Heart Rate Resp SpO2   02/06/19 1837 119/61 -- -- -- 20 --   02/06/19 1833 -- -- -- -- -- 100 %   02/06/19 1831 109/65 -- -- -- 24 --   02/06/19 1828 -- -- -- -- -- 99 %   02/06/19 1827 118/71 -- -- -- 24 --   02/06/19 1823 -- -- -- -- -- 99 %   02/06/19 1821 115/71 -- -- -- 24 --   02/06/19 1818 -- -- -- -- -- 98 %   02/06/19 1816 123/79 -- -- -- -- --   02/06/19 1814 123/79 -- -- -- -- 99 %   02/06/19 1813 -- -- -- -- -- 99 %   02/06/19 1812 119/75 -- -- -- -- --   02/06/19 1808 -- -- -- -- -- 99 %   02/06/19 1807 119/76 -- -- -- -- --   02/06/19 1803 -- -- -- -- -- 99 %   02/06/19 1802 121/71 -- -- -- -- --   02/06/19 1758 -- -- -- -- -- 99 %   02/06/19 1755 120/74 -- -- -- 22 --   02/06/19 1754 120/72 -- -- -- 22 --   02/06/19 1753 -- -- -- -- -- 99 %   02/06/19 1752 115/66 -- -- -- 24 --   02/06/19 1749 122/72 -- -- -- 24 --   02/06/19 1748 122/72 -- -- -- 24 99 %   02/06/19 1746 113/64 -- -- -- 24 --   02/06/19 1743 113/72 -- -- -- 24 98 %   02/06/19 1742 117/72 -- -- -- 24 98 %   02/06/19 1740 116/72 -- -- -- 24 --   02/06/19 1738 124/66 -- -- -- 24 99 %   02/06/19 1736 128/77 -- -- -- 24 --   02/06/19 1733 -- -- -- -- -- 100 %   02/06/19 1728 -- -- -- -- -- 100 %   02/06/19 1631 -- -- -- -- -- (!) 87 %   02/06/19 1630 -- -- -- -- -- 95 %   02/06/19 1625 -- -- -- -- -- 96 %   02/06/19 1620 -- -- -- -- -- 97 %   02/06/19 1600 -- -- -- -- -- 100 %   02/06/19 1530 -- -- -- -- -- 99 %   02/06/19 1427 -- -- -- -- 22 --   02/06/19 1330 127/79 98.1  F (36.7  C) Oral -- 22 --   02/06/19 1221 113/65 97.9  F (36.6  C) Oral -- 22 99 %   02/06/19 1130 -- -- -- -- -- 98 %   02/06/19 1110 -- -- -- -- -- 97 %   02/06/19 1100 128/75 98.3  F (36.8  C) Oral -- 22 100 %   02/06/19 1000 125/68 99.8  F (37.7  C) Oral -- 22 99 %   02/06/19 0930 -- -- -- -- -- 99 %    02/06/19 0858 -- -- -- -- -- 97 %   02/06/19 0853 -- -- -- -- -- 97 %   02/06/19 0848 -- -- -- -- -- 98 %   02/06/19 0843 -- -- -- -- -- 96 %   02/06/19 0838 128/80 98.6  F (37  C) Oral -- -- 97 %   02/06/19 0833 -- -- -- -- -- 98 %   02/06/19 0831 -- -- -- -- -- 93 %   02/06/19 0828 -- -- -- -- -- 97 %   02/06/19 0823 -- -- -- -- -- 97 %   02/06/19 0818 -- -- -- -- -- 97 %   02/06/19 0813 -- -- -- -- -- 97 %   02/06/19 0808 -- -- -- -- -- 98 %   02/06/19 0803 -- -- -- -- -- 96 %   02/06/19 0800 -- -- -- -- -- 100 %   02/06/19 0758 -- -- -- -- -- 100 %   02/06/19 0701 124/84 -- -- -- 20 --   02/06/19 0628 (!) 135/91 -- -- -- 20 --   02/06/19 0626 (!) 140/93 97.9  F (36.6  C) Oral -- 16 --   02/06/19 0458 126/78 -- -- -- 16 --   02/06/19 0231 122/72 98.4  F (36.9  C) Oral -- 16 --   02/06/19 0057 117/73 -- -- -- 20 --   02/05/19 2341 132/87 98.4  F (36.9  C) Oral -- 20 --   02/05/19 2100 131/82 98  F (36.7  C) Oral 90 18 --     General appearance: comfortable. Reports feeling a little pressure with contractions while on back, but very tolerable.   CONTACTIONS: every 3-4 minutes  Pitocin- 8 mu/min.,  Antibiotics- none  FETAL HEART TONES: continuous EFM- baseline 120 with moderate variability.  Accelerations- none.  Decelerations- variable and early. Period of fetal bradycardia following supine position during IUPC placement. FSE placed and OB team called to room to evaluate. O2 applied, patient repositioned from side to side, Pitocin shut off, and fluid bolus initiated.   ROM: clear fluid  PELVIC EXAM:9/ 100%/ +1.   # Pain Assessment:  Current Pain Score 2/6/2019   Patient currently in pain? denies   Christa s pain level was assessed and she currently denies pain.      ASSESSMENT:  ==============  IUP @ 42w0d in transition labor.   Fetal Heart Rate Tracing category two with prolonged deceleration  GBS- negative  Patient Active Problem List   Diagnosis     High-risk pregnancy in third trimester - Pt is seen at  [facility].  S CNM in labor     History of  delivery     Desires  (vaginal birth after ) trial     Labor and delivery, indication for care     Labor and delivery indication for care or intervention     PLAN:  ===========    Continue to monitor FHT and MVUs.   Restart Pitocin after period of normal, reassuring fetal heart tones and titrate per protocol.  Dr. Pham consenting patient for  if another period of prolonged fetal bradycardia.    Discussed plan to proceed with vaginal delivery.   Macedonian  at beside. All patient's and partner's questions answered.    MD on call, Dr. Tiwari, consulted in room, at bedside and agrees with plan.     I, Taryn Rolle, am serving as a scribe; to document services personally performed by  Emilia Gonzalez CNM based on data collection and the provider's statements to me.     NATTY Russ    The encounter was performed by me and scribed by the SNM. The scribed note accurately reflects my personal services and decisions made by me.   Emilia Gonzalez CNM APRN

## 2019-02-07 NOTE — ANESTHESIA CARE TRANSFER NOTE
Patient: Christa Martinez    Procedure(s):   SECTION    Diagnosis: pregnancy  Diagnosis Additional Information: No value filed.    Anesthesia Type:   No value filed.     Note:  Airway :Room Air  Patient transferred to:PACU  Comments: VSS. Breathing spontaneously at a regular rate with adequate tidal volumes and maintaining O2 sats on RA. Denies nausea or pain. No apparent complications from anesthesia.     Casa Parish DO  CA-2  Handoff Report: Identifed the Patient, Identified the Reponsible Provider, Reviewed the pertinent medical history, Discussed the surgical course, Reviewed Intra-OP anesthesia mangement and issues during anesthesia, Set expectations for post-procedure period and Allowed opportunity for questions and acknowledgement of understanding      Vitals: (Last set prior to Anesthesia Care Transfer)    CRNA VITALS  2019 - 2019 2101      2019             Pulse:  88    SpO2:  100 %                Electronically Signed By: Casa Parish DO  2019  9:05 PM

## 2019-02-07 NOTE — ANESTHESIA PROCEDURE NOTES
Peripheral Nerve Block Procedure Note    Staff:     Anesthesiologist:  Wanda Hansen MD    Resident/CRNA:  Casa Parish DO    Block performed by resident/CRNA in the presence of a teaching physician    Location: PACU  Procedure Start/Stop TImes:      2019 8:50 PM     2019 8:57 PM    patient identified, IV checked, site marked, risks and benefits discussed, informed consent, monitors and equipment checked, pre-op evaluation, at physician/surgeon's request and post-op pain management      Correct Patient: Yes      Correct Position: Yes      Correct Site: Yes      Correct Procedure: Yes      Correct Laterality:  Yes    Site Marked:  Yes  Procedure details:     Procedure:  TAP    ASA:  1    Diagnosis:  Post  pain control    Laterality:  Bilateral    Position:  Supine    Sterile Prep: chloraprep, patient draped, mask and sterile gloves      Local skin infiltration:  None    Needle:  Short bevel    Needle gauge:  21    Needle length (inches):  3.1    Ultrasound: Yes      Ultrasound used to identify targeted nerve, plexus, or vascular structure and placed a needle adjacent to it      Permanent Image entered into patiient's record      Abnormal pain on injection: No      Blood Aspirated: No      Paresthesias:  No    Bleeding at site: No      Bolus via:  Needle    Infusion Method:  Single Shot    Complications:  None  Assessment/Narrative:     Injection made incrementally with aspirations every (mL):  5

## 2019-02-07 NOTE — PLAN OF CARE
Data: Christa Martinez transferred to 7125 via cart at 0010. Baby transferred via parent's arms.  Action: Receiving unit notified of transfer: Yes. Patient and family notified of room change. Report given at 0000. Belongings sent to receiving unit. Accompanied by Registered Nurse. Oriented patient to surroundings. Call light within reach. ID bands double-checked with receiving RN.  Response: Patient tolerated transfer and is stable.

## 2019-02-07 NOTE — PROGRESS NOTES
Arrived on shift and to bedside at approximately 1835. Pt was in left side lying position. NATHAN Gonzalez, Dr. Tiwari and Dr. Calix at bedside. Recent recovery after prolonged FHR deceleration.     MD team discussing concerns r/t labor progress and cat 2 fetal tracing. Recommended continued observation at this time but  section consent was reviewed and signed.    Called back to bedside for reports of increased pressure during contractions. Pt remained in left side lying position for SVE. Cervix palpated all the way around fetal head. SVE 8/100/0. Caput and molding noted. After sve, FHR via FSE noted to be in 60s. Not recovering with 02 and position change.    Code c/s called. OB MD, anesthesia, nicu all to OR. Pt to OR immediately.  FHR 120s-130s upon arrival to OR.     See OB MD note.    IASIAH Carbajal, NATHAN

## 2019-02-07 NOTE — PLAN OF CARE
Pt had slow progress, she wanted an Epidural early evening. After epidural was placed and patient was comfortable, she agreed to internal monitoring. IUPC was placed and FHT's had prolonged deceleration, pitocin was discontinued, Oxygen per non rebreather mas, at 15l continuous until delivery, IV fluid bolus, not hypotensive, postion changes help resolve deceleration. Dr Tiwari counseled Pt on possible C/S, but ok to continue with labor, pt agreed. Then at 1856 pt having a lot of rectal pressure, Genesis Clarke CNM  did SVE, and again FHT's deceled to 60's. Code C/S was called.  FHT's in OR recovered to 140's, therefore they proceeded with non- code C/S.

## 2019-02-07 NOTE — ANESTHESIA POSTPROCEDURE EVALUATION
Anesthesia POST Procedure Evaluation    Patient: Christa Martinez   MRN:     [id number] Gender:   female   Age:    28 year old :      1990        Preoperative Diagnosis: pregnancy   Procedure(s):   SECTION   Postop Comments: No value filed.       Anesthesia Type:  Regional    Reportable Event: NO     PAIN: Uncomplicated   Sign Out status: Comfortable, Well controlled pain     PONV: No PONV   Sign Out status:  No Nausea or Vomiting     Neuro/Psych: Uneventful perioperative course   Sign Out Status: Preoperative baseline; Age appropriate mentation     Airway/Resp.: Uneventful perioperative course   Sign Out Status: Non labored breathing, age appropriate RR; Resp. Status within EXPECTED Parameters     CV: Uneventful perioperative course   Sign Out status: Appropriate BP and perfusion indices; Appropriate HR/Rhythm     Disposition:   Sign Out in:  Labor and Delivery  Disposition:  Labor and Delivery  Recovery Course: Uneventful  Follow-Up: Not required           Last Anesthesia Record Vitals:  CRNA VITALS  2019 - 2019             Pulse:  88    SpO2:  100 %          Last PACU/Preop Vitals:  Vitals:    19 2120 19   BP:  107/58 96/55   Pulse:  61 64   Resp: 20 13 21   Temp:      SpO2: 99% 99% 98%         Electronically Signed By: Wanda Pastor MD, 2019, 9:52 PM

## 2019-02-07 NOTE — PLAN OF CARE
Data: Vital signs within normal limits. Postpartum checks within normal limits - see flow record. Patient eating and drinking normally. Patient has joe in, with adequate urine output. D5LR running. No apparent signs of infection. Incision covered, dressing is clean, dry, and intact. Patient has been breastfeeding baby with minimal to no assist.  Action: Patient medicated during the shift for mild incisional pain . See MAR. Patient reassessed within 1 hour after each medication and pain was improved - patient stated she was comfortable. See flow record.  Response: Positive attachment behaviors observed with infant. Patients signficant other present and supportive of patient and infant.   Plan: Continue to monitor for adequate pain management.

## 2019-02-07 NOTE — PROGRESS NOTES
Christa Martinez      MRN#: [id number]  Age: 28 year old      YOB: 1990      CNM Social Rounds    - Pt noted up ambulating to bathroom independently, walking without issue.  FOB and family support at bedside caring for her son.  Pt, FOB, and CNM communicating effectively with phone Turkmen  # [phone]. Pt and FOB well known to this writer from prenatal care at [facility]. Pt and FOB very happy with their cares and are happy that she tried to labor as long as she did - feel like she gave it her all.  Breastfeeding is going well overall but baby is sleepy - has gone 4-5 hours between feeds.  Reinforced recommendation to breastfeed (or at least attempt) every 2-3 hours until milk comes in. FOB with questions about infant failing hearing screen - this writer spoke with hearing screening team who states re screen will happen before discharge.  Reviewed CNM social rounds/involvement postpartum. Pt verbalized understanding of CNM social role.   Pt and FOB have no unidentified unmet needs at this time.  Pt plans to follow up with [facility] clinic 2-3 days after discharge for infant visit and OB team check in and then 1 week later for incision check.  Rolanda COLON CNM

## 2019-02-07 NOTE — PLAN OF CARE
Patient arrived to [facility] unit via zoom cart at 0010 ,with belongings, accompanied by spouse/ significant other, with infant in arms. Received report from LEIGHA Rodgers  and checked bands. Fundus firm, with scant bleeding. Vitals stable at this time.  Unit and room orientation started. Call light given and within arms reach; no concerns present at this time. Continue with plan of care.

## 2019-02-07 NOTE — OP NOTE
[facility]  Full Operative Progress Note     Surgery Date:   2019  Surgeon:           Latasha Tiwari MD  Assistants:       Starla Lamar MD PhD PGY-3                              Pre-op Diagnosis:           -  at 42w0d  - Fetal bradycardia, intermittent  - Category II FHT  - Failed TOLAC  - History of  section  - Obesity     Post-op Diagnosis:         - Same as above     Procedure:        Urgent repeat low-transverse  section with double layer uterine closure via Pfannensteil incision     Anesthesia:      Spinal  QBL:     920 mL  IVF:       800 mL crystalloid  UOP:    200 mL clear urine at the end of the case  Drains: Read catheter   Specimens:      Placenta, cord segment  Complications:  Left hysterotomy extension 3 cm in length, superficial laceration of the skin over presenting shoulder     Indications:   Christa Martinez is a 28 year old  at 42w0d admitted for an induction of labor due to late term gestation. Her pregnancy was notable for history of previous  section in [place], obesity and HepB nonimmunitry. she progressed to 9 cm dilated and a category III fetal heart tracing with fetal bradycardia developed.  Initially, resuscitative efforts were successful, and FHT was category II.  Shortly thereafter, patient had more rectal pressure, was positioned supine for cervix check and again, fetal bradycardia was noted.  Pt was brought to the operating room where FHT was again noted to be category II.  However, given slow progression of cervical change and fetal intolerance of labor,  birth was recommended.  The risks, benefits, and alternatives of  section had been discussed with the patient in the labor room, and she agreed to proceedin the operating room.      Findings:   Vigorous male infant born on 2021 at 1932 by  section. APGARs 8/9 at 1 and 5 minutes, weight pending. Cephalic presentation, ROT  position. Clear amniotic fluid. No nuchal cord. Placenta delivered intact with 3-vessel cord. Normal appearing uterus, ovaries and fallopian tubes. Moderate abdominal wall adhesions with minimal intraabdominal wall adhesions.     Procedure Details:   The patient was brought to the OR, where epidural anesthesia was dosed and found to adequate.  She was placed in the dorsal lithotomy supine position with a slight leftward tilt. She was prepped and draped in the usual sterile fashion. A surgical time out was performed.     A low transverse skin incision was made with the scalpel, and carried down to the underlying fascia with sharp and blunt dissection. The fascia was incised in the midline, and the incision was extended laterally with the Julian scissors. The superior aspect of the fascia was grasped with the Kocher clamps and dissected off of the underlying rectus muscles with blunt and sharp dissection. Attention was then turned to the inferior aspect of the fascia, which was similarly dissected off of the underlying rectus muscles. The rectus muscles were  in the midline, and the peritoneum was entered sharply, and the opening was extended with digital pressure. The bladder blade was placed.     A transverse hysterotomy was made with the scalpel in the lower uterine segment, and the incision was extended with digital pressure. The infant was noted to be in the ROT position, and was delivered atraumatically. The shoulders delivered easily.  No nuchal cord was noted. After >60 delay, the cord was doubly clamped and cut, and the infant was handed off to the awaiting nursery staff. A segment of cord was cut. The placenta was expressed.     The uterus was exteriorized and cleared of all clots and debris. Uterine tone was noted to be firm with 20 units of pitocin given through the running IV and uterine massage.   The hysterotomy and left sided extension were closed with a running locked suture of 0 Vicryl.  The  right side of the hysterotomy was then imbricated using an 0 Monocryl suture for hemostasis. The hysterotomy was noted to be hemostatic. The posterior cul-de-sac was cleared of all clots and debris. The uterus was returned to the abdomen.     The pericolic gutters were cleared of all clots and debris. The hysterotomy was re-examined and noted to be hemostatic.  The fascia and rectus muscles were examined and areas of oozing were controlled with electrocautery. The fascia was closed with a running 0 looped PDS suture. The subcutaneous tissue was irrigated and areas of oozing were controlled with electrocautery. The subcutaneous tissue was >2 cm in thickness, and was therefore closed with 2-0 plain. The skin was closed with 4-0 monocryl and covered with a sterile dressing.      Patient transferred to PACU in stable condition.    Dr. Tiwari was scrubbed and present for entire procedure.     Starla Lamar MD PhD  OB/GYN Resident, PGY-3  2/6/2019 8:59 PM     I participated in all aspects of Christa Martinez's case with Dr. Lamar on 2/6/2019 and agree with the operative details in this note with edits by me.     Latasha Tiwari MD MPH

## 2019-02-08 VITALS
TEMPERATURE: 98.7 F | RESPIRATION RATE: 16 BRPM | SYSTOLIC BLOOD PRESSURE: 122 MMHG | OXYGEN SATURATION: 100 % | DIASTOLIC BLOOD PRESSURE: 77 MMHG | HEART RATE: 86 BPM

## 2019-02-08 PROCEDURE — 12000001 ZZH R&B MED SURG/OB UMMC

## 2019-02-08 PROCEDURE — 25000132 ZZH RX MED GY IP 250 OP 250 PS 637: Performed by: STUDENT IN AN ORGANIZED HEALTH CARE EDUCATION/TRAINING PROGRAM

## 2019-02-08 PROCEDURE — 25000128 H RX IP 250 OP 636: Performed by: STUDENT IN AN ORGANIZED HEALTH CARE EDUCATION/TRAINING PROGRAM

## 2019-02-08 RX ADMIN — IBUPROFEN 800 MG: 800 TABLET ORAL at 16:04

## 2019-02-08 RX ADMIN — ACETAMINOPHEN 975 MG: 325 TABLET, FILM COATED ORAL at 03:51

## 2019-02-08 RX ADMIN — IBUPROFEN 800 MG: 800 TABLET ORAL at 22:14

## 2019-02-08 RX ADMIN — IBUPROFEN 800 MG: 800 TABLET ORAL at 10:23

## 2019-02-08 RX ADMIN — ACETAMINOPHEN 975 MG: 325 TABLET, FILM COATED ORAL at 20:00

## 2019-02-08 RX ADMIN — SENNOSIDES AND DOCUSATE SODIUM 2 TABLET: 8.6; 5 TABLET ORAL at 10:23

## 2019-02-08 RX ADMIN — ACETAMINOPHEN 975 MG: 325 TABLET, FILM COATED ORAL at 11:53

## 2019-02-08 RX ADMIN — ENOXAPARIN SODIUM 40 MG: 40 INJECTION SUBCUTANEOUS at 21:57

## 2019-02-08 NOTE — PLAN OF CARE
Data: Vital signs within normal limits. Postpartum checks within normal limits - see flow record. Patient eating and drinking normally. Patient has joe catheter in her bladder due to urine retention, urine output has been adequate overnight. Patient is up independently. No apparent signs of infection. Incision is covered. Dressing is clean, dry, and intact. Patient performing self cares and is able to care for infant.  Action: Patient medicated during the shift for mild pain with tylenol. Patient has declined ibuprofen throughout the shift . See MAR. Patient reassessed within 1 hour after each medication and pain was improved - patient stated she was comfortable.   Response: Positive attachment behaviors observed with infant. Support persons, Elvis, present, active in baby cares, and is supportive of patient.  Plan: will continue to monitor for adequate pain management. Spoke with Dr. Lamar G3 this morning about patient's joe, she states to leave the Joe in until this afternoon. May be pulled 24 hours after it was placed.

## 2019-02-08 NOTE — PROGRESS NOTES
[facility]   Post-partum Note    Name:  Christa Martinez  MRN: [id number]    S: Patient is feeling better today.  Pain is controlled.  Denies dizziness, chest pain, SOB, nausea or vomiting. Tolerating regular diet without nausea or vomiting.  Ambulating without dizziness.  Read catheter is in place due to urinary retention yesterday.  Reports flatus.  Lochia is appropriate.  Breast feeding.  Plans IUD for postpartum contraception.     O:   Patient Vitals for the past 24 hrs:   BP Temp Temp src Pulse Heart Rate Resp SpO2   19 0015 112/74 98.9  F (37.2  C) Oral 96 -- 16 --   19 2042 119/68 98.9  F (37.2  C) Oral -- 90 16 --   19 1600 98/56 98.5  F (36.9  C) Oral -- 79 18 100 %   19 1230 95/54 98.2  F (36.8  C) Oral -- 87 18 100 %   19 0920 109/52 98.2  F (36.8  C) Oral -- 70 18 100 %     Gen:  Resting comfortably, NAD  CV:  RRR, no murmur  Pulm:  CTAB, no wheezes  Abd:  Soft, appropriately ttp, Mildly distended.Fundus is below the umbilicus, firm and non-tender.  Incision: c/d/i no surrounding redness or erythema  Ext:  non-tender, 1+ LE edema b/l    I/O last 3 completed shifts:  In: 700 [P.O.:700]  Out: 3600 [Urine:3600]    Hgb:   Hemoglobin   Date Value Ref Range Status   2019 9.4 (L) 11.7 - 15.7 g/dL Final       Assessment/Plan:  Christa Martinez is a 28 year old  on PPD #1 s/p urgent RLTCS for non-reassuring fetal heart tracing.      Elevated BP:  - One elevated BP on 19 in the AM  - BP has been normotensive overnight  - UPC 0.54 (unknown if this was a clean catch vs straight cath specimen)  - HELLP wnl  - no signs or symptoms of pre-eclampsia     Urinary Retention:  - Read replaced on POD#1, plan to remove after 24 hrs followed by a trial of void    Postpartum cares:  - continue with routine postpartum management  Pain:     Well-controlled  Hgb:      12.8>> 9.4. Patient is asymptomatic from acute blood loss  anemia and vitals are reassuring will discharge home with PO iron  Rh:        positive  Rubella: immune  HepB:  nonimmune, will offer HepB vaccine PP  Feed:    breast  BC:       IUD  Dispo:   DC pending clinical course re: tatyana Lamar MD PhD  Ob/Gyn PGY-3  2/8/2019 6:24 AM    [facility] staff:  Appreciate note by Dr. Lamar.  I have seen and examined the patient without the resident. I have reviewed, edited, and agree with the note.        Zuleika Mcdermott MD, FACOG  2/8/2019  8:32 AM

## 2019-02-08 NOTE — PROVIDER NOTIFICATION
02/08/19 0123   Provider Notification   Provider Name/Title ARMANDO Calix   Method of Notification Electronic Page   Request Evaluate-Remote   Notification Reason Other  (see note)   I was told in report that patient was to keep joe in overnight due to being unable to void today and needing 4 attempts to straight cath. Patient does not have new joe order, can you put one in? also when did you want me to take it out? thanks

## 2019-02-08 NOTE — PROGRESS NOTES
Courtesy Round: Christa is doing ok this am - she still has a joe in her bladder because she wasn't emptying her bladder fully postop. She recounts her labor as a bit scary, she recalls several people entering the room when the FHTs were down and she was told she would need a c/section. Listened to her story and validated her experience, offered CNM care ongoing as desired. Baby Raul is rooming in and doing well.     Tami Escoto, NATHAN

## 2019-02-08 NOTE — PLAN OF CARE
Patient's postpartum assessment WDL, vital signs stable. Fundus firm and midline, lochia WDL. Incision dressing dry and intact (scant amount of dried lochia on lower part of dressing). Joe catheter in place and draining well. Adequate urine output. Joe will be in place until 1730 tonight (24 hours)- per resident and Dr. Mcdermott. Breastfeeds infant well on cue, latch-on verified. Taking tylenol and ibuprofen for pain, declined roxicodone. +3 edema present in lower extremities. Birth certificate completed with  and handed in. Does not need ROP. Will continue to monitor. After joe removal, if trial to void is successful, patient could discharge tonight or tomorrow morning per MD.

## 2019-02-08 NOTE — PLAN OF CARE
Data: Vital signs within normal limits. Postpartum checks within normal limits - see flow record. Patient eating and drinking normally. Read catheter in place and will stay in place overnight I/O adequate. No apparent signs of infection. Incision UTV. IV removed due to infiltration when nurse attempted to flush IV for toradol.  Patient performing self cares and is able to care for infant. Breastfeeding baby independently.   Action: Patient medicated during the shift for pain. See MAR. Patient reassessed within 1 hour after each medication and pain was improved - patient stated she was comfortable. Patient education done about  24hr tests and bath via . See flow record.  Response: Positive attachment behaviors observed with infant. Support persons Elvis present.   Plan:  continue plan of care .

## 2019-02-09 LAB
BLD PROD TYP BPU: NORMAL
BLD PROD TYP BPU: NORMAL
BLD UNIT ID BPU: 0
BLD UNIT ID BPU: 0
BLOOD PRODUCT CODE: NORMAL
BLOOD PRODUCT CODE: NORMAL
BPU ID: NORMAL
BPU ID: NORMAL
TRANSFUSION STATUS PATIENT QL: NORMAL

## 2019-02-09 NOTE — PLAN OF CARE
VSS. Postpartum assessment WNL. Denies pain, well controlled with ibuprofen and tylenol. Pt able to void after removal of joe catheter. Reviewed AVS and discharge medications with use of . Pt reported wanting to wait to discuss breast pump coverage with provider outpatient. Pt requested to wait until follow up appointment to receive hepatitis B vaccine. Encouraged pt to watch educational videos. Pt discharged to home with spouse and baby.

## 2019-02-09 NOTE — DISCHARGE INSTRUCTIONS
Instrucciones de louis para david cesárea  A usted le realizaron david operación cesárea. Dominic esta cirugía, nació vaughan bebé a través de david incisión (argenis) quirúrgica en vaughan abdomen y útero. La recuperación total después de david cesárea lleva tiempo. Es importante que se cuide, tanto por usted misma elver porque vaughan bebé la necesita. La siguiente es david guía para vaughan recuperación en casa.  Cuidado de la incisión  Cuide así de vaughan herida:    Dúchese cuando lo necesite. Seque vaughan incisión dando toques suaves.    Vigile vaughan incisión para buscar signos de infección, elver aumento del enrojecimiento o secresión.    Sostenga david almohada contra la incisión cuando ría o tosa y cuando se levante de david posición de acostada o sentada.    Recuerde: David incisión de cesárea puede tardar hasta seis semanas en sanar.  Actividad  A continuación encontrará algunas sugerencias:    No cuide de nadie más además de vaughan bebé y usted misma.    Recuerde: Cuanto mas activa esté, más probable será que aumente vaughan sangrado.    Descanse mucho. Tyrone siestas por la tarde.    Aumente gradualmente vaughan nivel de actividad.    Planifique jannette actividades para no tener que subir o bajar escaleras más de lo necesario.    Tyrone ejercicios posoperatorios (para después de david cirugía) de respiración profunda y tos. Pídale instrucciones a vaughan proveedor de atención médica.    No levante nada que sea más pesado que vaughan bebé hasta que vaughan proveedor de atención médica de indique que ya PUEDE.    No conduzca hasta que vaughan proveedor de atención médica le diga que ya PUEDE.    No tenga relaciones sexuales hasta después de que haya gomez a david nanci de seguimiento con vaughan proveedor de atención médica y que haya decidido qué método anticonceptivo usará.    Cuándo debe llamar a vaughan proveedor de atención médica?  Llame enseguida a vaughan proveedor de atención médica si nota cualquiera de estos síntomas:    Fiebre de 100.4  F (38  C) o más    Enrojecimiento, dolor o supuración en la mica de la  incisión    Sangrado que requiere david nueva toalla sanitaria cada hora    Dolor kavon en el abdomen    Dolor al orinar o deseo urgente de orinar    Secresión vaginal con mal olor    Dificultad para orinar o para vaciar la vejiga    Falta de evacuación de intestinos después de david semana de killian dado a leana    Jeana hinchada, júnior y adolorida en david pierna    Aparición de salpullido o ronchas    Jeana adolorida y enrojecida en los senos que puede estar acompañada de síntomas elver de gripe    Sentimientos de ansiedad, pánico y/o depresión  Visitas de control  Tyrone david nanci de seguimiento según le haya indicado nuestro personal.  Date Last Reviewed: 2015-2018 [employer]. [address] [place] [address]. Todos los derechos reservados. Esta información no pretende sustituir la atención médica profesional. Sólo vaughan médico puede diagnosticar y tratar un problema de jayme.        Discharge Instructions for  Section ()  You had a  section, or . During the , your baby was delivered through an incision in your stomach and uterus. Full recovery after a  can take time. It s important to take care of yourself -- for your own sake and because your new baby needs you. Here are some guidelines to follow at home.  Incision care  Here's how to take care of your incision:    Shower as needed. Pat your incision dry.    Watch your incision for signs of infection, like more redness or drainage.    Hold a pillow against the incision when you laugh or cough and when you get up from a lying or sitting position.    Remember, it can take as long as 6 weeks for your incision to heal.  Activity  Here are some suggestions:    Don t try to take care of anyone other than your baby and yourself.    Remember, the more active you are, the more likely you are to have an increase in your bleeding.    Get lots of rest. Take naps in the afternoon.    Increase your  activities bit by bit.    Plan your activities so that you don t have to go up or down stairs more than needed.    Do postsurgical deep breathing and coughing exercises. Ask your healthcare provider for instructions.    Don t lift anything heavier than your baby until your healthcare provider tells you it s OK.    Don t drive until your healthcare provider says it s OK.    Don t have sexual intercourse until after you ve had a checkup with your healthcare provider and you have decided on a birth control method.    Allow others to do things for you. Don't hesitate to ask for help.  Follow-up  Make a follow-up appointment as directed by our staff.  When to call your healthcare provider  Call your healthcare provider right away if you have any of these:    Fever of 100.4 F (38 C) or higher    Redness, pain, or drainage at your incision site    Bleeding that requires a new sanitary pad every hour    Severe pain in the abdomen    Pain or urgency with urination    Foul odor from vaginal discharge    Trouble urinating or emptying your bladder    No bowel movement within 1 week after the birth of your baby    Swollen, red, painful area in the leg    Appearance of rash or hives    Sore, red, painful area on the breasts that may come with flu-like symptoms    Feelings of anxiety, panic, and/or depression   Date Last Reviewed: 2/1/2018 2000-2018 [employer]. [address] [place] [address], [place] [address]. All rights reserved. This information is not intended as a substitute for professional medical care. Always follow your healthcare professional's instructions.

## 2019-04-15 NOTE — PROGRESS NOTES
Blood pressure 122/80, temperature 98.2  F (36.8  C), temperature source Oral, resp. rate 16.  Patient Vitals for the past 24 hrs:   BP Temp Temp src Resp   19 1302 -- 98.2  F (36.8  C) Oral --   19 1257 122/80 -- -- 16     General appearance: Feeling occasional contractions. IV is in place, admission labs have been drawn. Pt ready for cook catheter placement.   CONTRACTIONS: irreg and every 5-10 minutes  Pitocin- none,  Antibiotics- none  FETAL HEART TONES: continuous EFM- baseline 140 with moderate variability and positive accelerations. No decelerations.  ROM: not ruptured  PELVIC EXAM: 3, mid/med     Cook catheter placed at 1520 with pt consent after review of r/b/a    Uterine and vaginal balloons inflated to 80cc/80cc    Correct placement confirmed.     # Pain Assessment:  Current Pain Score 2019   Patient currently in pain? denies   Christa s pain level was assessed and she currently denies pain.      ASSESSMENT:  ==============  IUP @ 41w6d for IOL- late term and borderline oligo   Fetal Heart Rate Tracing category one  GBS- - neg     TOLAC- consent signed    Becerra 4  Cook catheter placed 80/80 at 5    Patient Active Problem List   Diagnosis     High-risk pregnancy in third trimester - Pt [facility] at [facility].  [facility] CNM in labor     History of  delivery     Desires  (vaginal birth after ) trial     Labor and delivery, indication for care     Labor and delivery indication for care or intervention     PLAN:  ===========  Ambulation, hydration, position changes, birthing ball/sling and tub options to facilitate labor.  Cook catheter placed and inflated to 80/80cc with pt consent. Placed easily- position if uterine and vaginal balloons confirmed- correct.   Discussed that vaginal balloon may be inflated at 12 hours or prn. Then uterine balloon may stay in place to tension an additional 12 hours. Reviewed IV pitocin as an option after cook catheter is out.  Pt and  FOB verbalized understanding.   Continuous EFM and TOCO d/t prior c/s and IOL.   MD on call Dr. Cohen consulted and agrees with plan.     ISAIAH Carbajal, NATHAN    Mohs Histo Method Verbiage: Each section was then chromacoded and processed in the Mohs lab using the Mohs protocol and submitted for frozen section.

## 2022-08-02 ENCOUNTER — LAB REQUISITION (OUTPATIENT)
Dept: LAB | Facility: CLINIC | Age: 32
End: 2022-08-02

## 2022-08-02 DIAGNOSIS — Z00.00 ENCOUNTER FOR GENERAL ADULT MEDICAL EXAMINATION WITHOUT ABNORMAL FINDINGS: ICD-10-CM

## 2022-08-02 DIAGNOSIS — E66.9 OBESITY, UNSPECIFIED: ICD-10-CM

## 2022-08-02 LAB
ALBUMIN SERPL BCG-MCNC: 4.4 G/DL (ref 3.5–5.2)
ALP SERPL-CCNC: 87 U/L (ref 35–104)
ALT SERPL W P-5'-P-CCNC: 19 U/L (ref 10–35)
ANION GAP SERPL CALCULATED.3IONS-SCNC: 12 MMOL/L (ref 7–15)
AST SERPL W P-5'-P-CCNC: 23 U/L (ref 10–35)
BILIRUB SERPL-MCNC: 0.2 MG/DL
BUN SERPL-MCNC: 14.2 MG/DL (ref 6–20)
CALCIUM SERPL-MCNC: 9.6 MG/DL (ref 8.6–10)
CHLORIDE SERPL-SCNC: 101 MMOL/L (ref 98–107)
CHOLEST SERPL-MCNC: 166 MG/DL
CREAT SERPL-MCNC: 0.61 MG/DL (ref 0.51–0.95)
DEPRECATED HCO3 PLAS-SCNC: 24 MMOL/L (ref 22–29)
GFR SERPL CREATININE-BSD FRML MDRD: >90 ML/MIN/1.73M2
GLUCOSE SERPL-MCNC: 88 MG/DL (ref 70–99)
HDLC SERPL-MCNC: 64 MG/DL
LDLC SERPL CALC-MCNC: 85 MG/DL
NONHDLC SERPL-MCNC: 102 MG/DL
POTASSIUM SERPL-SCNC: 3.6 MMOL/L (ref 3.4–5.3)
PROT SERPL-MCNC: 8.1 G/DL (ref 6.4–8.3)
SODIUM SERPL-SCNC: 137 MMOL/L (ref 136–145)
TRIGL SERPL-MCNC: 84 MG/DL

## 2022-08-02 PROCEDURE — 80053 COMPREHEN METABOLIC PANEL: CPT | Performed by: FAMILY MEDICINE

## 2022-08-02 PROCEDURE — 80061 LIPID PANEL: CPT | Performed by: FAMILY MEDICINE

## (undated) DEVICE — STOCKING SLEEVE COMPRESSION CALF LG

## (undated) DEVICE — PREP CHLORAPREP 26ML TINTED ORANGE  260815

## (undated) DEVICE — SOL WATER IRRIG 1000ML BOTTLE 07139-09

## (undated) DEVICE — ESU GROUND PAD UNIVERSAL W/O CORD

## (undated) DEVICE — BASIN SET MAJOR

## (undated) DEVICE — PACK C-SECTION LF PL15OTA83B

## (undated) DEVICE — DRSG STERI STRIP 1/4X3" R1541

## (undated) DEVICE — CATH TRAY FOLEY 16FR SILICONE 907416

## (undated) DEVICE — STRAP KNEE/BODY 31143004

## (undated) DEVICE — SOL NACL 0.9% IRRIG 1000ML BOTTLE 07138-09

## (undated) DEVICE — SUCTION CANISTER MEDIVAC LINER 1500ML W/LID 65651-515

## (undated) RX ORDER — OXYTOCIN/0.9 % SODIUM CHLORIDE 30/500 ML
PLASTIC BAG, INJECTION (ML) INTRAVENOUS
Status: DISPENSED
Start: 2019-02-06

## (undated) RX ORDER — KETOROLAC TROMETHAMINE 30 MG/ML
INJECTION, SOLUTION INTRAMUSCULAR; INTRAVENOUS
Status: DISPENSED
Start: 2019-02-06